# Patient Record
Sex: FEMALE | Race: WHITE | Employment: OTHER | URBAN - METROPOLITAN AREA
[De-identification: names, ages, dates, MRNs, and addresses within clinical notes are randomized per-mention and may not be internally consistent; named-entity substitution may affect disease eponyms.]

---

## 2021-05-03 ENCOUNTER — HOSPITAL ENCOUNTER (EMERGENCY)
Facility: HOSPITAL | Age: 86
Discharge: HOME | End: 2021-05-04
Attending: EMERGENCY MEDICINE
Payer: MEDICARE

## 2021-05-03 DIAGNOSIS — S09.90XA INJURY OF HEAD, INITIAL ENCOUNTER: ICD-10-CM
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
## 2021-05-03 DIAGNOSIS — N30.00 ACUTE CYSTITIS WITHOUT HEMATURIA: ICD-10-CM

## 2021-05-03 PROCEDURE — 99284 EMERGENCY DEPT VISIT MOD MDM: CPT

## 2021-05-03 PROCEDURE — 36415 COLL VENOUS BLD VENIPUNCTURE: CPT

## 2021-05-03 PROCEDURE — 93010 ELECTROCARDIOGRAM REPORT: CPT

## 2021-05-04 ENCOUNTER — APPOINTMENT (OUTPATIENT)
Dept: CT IMAGING | Facility: HOSPITAL | Age: 86
End: 2021-05-04
Attending: EMERGENCY MEDICINE
Payer: MEDICARE

## 2021-05-04 VITALS
SYSTOLIC BLOOD PRESSURE: 151 MMHG | OXYGEN SATURATION: 100 % | WEIGHT: 142 LBS | TEMPERATURE: 98 F | HEART RATE: 88 BPM | BODY MASS INDEX: 21.03 KG/M2 | RESPIRATION RATE: 20 BRPM | DIASTOLIC BLOOD PRESSURE: 85 MMHG | HEIGHT: 69 IN

## 2021-05-04 PROCEDURE — 87077 CULTURE AEROBIC IDENTIFY: CPT | Performed by: EMERGENCY MEDICINE

## 2021-05-04 PROCEDURE — 80053 COMPREHEN METABOLIC PANEL: CPT | Performed by: EMERGENCY MEDICINE

## 2021-05-04 PROCEDURE — 87086 URINE CULTURE/COLONY COUNT: CPT | Performed by: EMERGENCY MEDICINE

## 2021-05-04 PROCEDURE — 70450 CT HEAD/BRAIN W/O DYE: CPT | Performed by: EMERGENCY MEDICINE

## 2021-05-04 PROCEDURE — 81001 URINALYSIS AUTO W/SCOPE: CPT | Performed by: EMERGENCY MEDICINE

## 2021-05-04 PROCEDURE — 85610 PROTHROMBIN TIME: CPT | Performed by: EMERGENCY MEDICINE

## 2021-05-04 PROCEDURE — 87186 SC STD MICRODIL/AGAR DIL: CPT | Performed by: EMERGENCY MEDICINE

## 2021-05-04 PROCEDURE — 93005 ELECTROCARDIOGRAM TRACING: CPT

## 2021-05-04 PROCEDURE — 85025 COMPLETE CBC W/AUTO DIFF WBC: CPT | Performed by: EMERGENCY MEDICINE

## 2021-05-04 PROCEDURE — 72125 CT NECK SPINE W/O DYE: CPT | Performed by: EMERGENCY MEDICINE

## 2021-05-04 RX ORDER — PHENAZOPYRIDINE HYDROCHLORIDE 200 MG/1
200 TABLET, FILM COATED ORAL ONCE
Status: COMPLETED | OUTPATIENT
Start: 2021-05-04 | End: 2021-05-04

## 2021-05-04 RX ORDER — CEPHALEXIN 500 MG/1
500 CAPSULE ORAL ONCE
Status: COMPLETED | OUTPATIENT
Start: 2021-05-04 | End: 2021-05-04

## 2021-05-04 RX ORDER — METHIMAZOLE 5 MG/1
5 TABLET ORAL DAILY
COMMUNITY

## 2021-05-04 RX ORDER — WARFARIN SODIUM 2 MG/1
2 TABLET ORAL NIGHTLY
Status: ON HOLD | COMMUNITY
End: 2021-07-03

## 2021-05-04 RX ORDER — ATENOLOL 50 MG/1
50 TABLET ORAL 2 TIMES DAILY
COMMUNITY

## 2021-05-04 RX ORDER — CEPHALEXIN 500 MG/1
500 CAPSULE ORAL 4 TIMES DAILY
Qty: 28 CAPSULE | Refills: 0 | Status: SHIPPED | OUTPATIENT
Start: 2021-05-04 | End: 2021-05-14

## 2021-05-04 RX ORDER — MULTIVIT-MIN/IRON FUM/FOLIC AC 7.5 MG-4
1 TABLET ORAL DAILY
COMMUNITY

## 2021-05-04 RX ORDER — PROPAFENONE HYDROCHLORIDE 150 MG/1
150 TABLET, FILM COATED ORAL 3 TIMES DAILY
COMMUNITY

## 2021-05-04 RX ORDER — PHENAZOPYRIDINE HYDROCHLORIDE 100 MG/1
100 TABLET, FILM COATED ORAL 3 TIMES DAILY PRN
Qty: 6 TABLET | Refills: 0 | Status: SHIPPED | OUTPATIENT
Start: 2021-05-04 | End: 2021-05-07

## 2021-05-04 RX ORDER — LOSARTAN POTASSIUM 100 MG/1
100 TABLET ORAL 2 TIMES DAILY
Status: ON HOLD | COMMUNITY
End: 2021-12-04

## 2021-05-04 NOTE — ED QUICK NOTES
Pt going back to [facility], report called to Rosemary at [facility]

## 2021-05-04 NOTE — ED PROVIDER NOTES
Patient Seen in: [facility] Emergency Department      History   Patient presents with:  Fall    Stated Complaint: from [facility], arrived via EMS post fall.  Pt on Coumadin    HPI/Subjective:   HPI    Patient is an 56-year-old female prese intact, sclerae white, conjunctiva is pink.   Oropharynx is unremarkable, no exudate, dentition intact, no facial bone tenderness or septal hematomas, TMs clear bilaterally  NECK: Supple, trachea midline, no lymphadenopathy, full ROM cervical spine without components:    .0 (*)     RDW-SD 47.5 (*)     Neutrophil Absolute Prelim 7.72 (*)     Neutrophil Absolute 7.72 (*)     Monocyte Absolute 1.06 (*)     All other components within normal limits   CBC WITH DIFFERENTIAL WITH PLATELET    Narrative:     T clinical confidence and prior to discharge, that an emergency medical condition was not or was no longer present. There was no indication for further evaluation, treatment or admission on an emergency basis.   Comprehensive verbal and written discharge and

## 2021-05-04 NOTE — ED INITIAL ASSESSMENT (HPI)
Arrived via EMS from [facility] for fall eval. Pt states she was walking to the bathroom when she lost balance and fell. Pt arrived with C collar in place. Hematoma noted to the L forehead, unknown LOC. Pt denies neck and back pain.  Pt on Coumadi

## 2021-07-02 ENCOUNTER — HOSPITAL ENCOUNTER (OUTPATIENT)
Facility: HOSPITAL | Age: 86
Setting detail: OBSERVATION
Discharge: HOME | End: 2021-07-03
Attending: STUDENT IN AN ORGANIZED HEALTH CARE EDUCATION/TRAINING PROGRAM | Admitting: HOSPITALIST
Payer: MEDICARE

## 2021-07-02 DIAGNOSIS — Z79.01 CHRONIC ANTICOAGULATION: ICD-10-CM

## 2021-07-02 DIAGNOSIS — R79.1 SUPRATHERAPEUTIC INR: ICD-10-CM

## 2021-07-02 DIAGNOSIS — I10 HYPERTENSION, UNSPECIFIED TYPE: ICD-10-CM

## 2021-07-02 DIAGNOSIS — R04.0 EPISTAXIS: Primary | ICD-10-CM

## 2021-07-02 PROBLEM — D69.6 THROMBOCYTOPENIA: Status: ACTIVE | Noted: 2021-07-02

## 2021-07-02 PROBLEM — D69.6 THROMBOCYTOPENIA (HCC): Status: ACTIVE | Noted: 2021-07-02

## 2021-07-02 LAB
APTT PPP: 53 SECONDS (ref 25.4–36.1)
BASOPHILS # BLD AUTO: 0.04 X10(3) UL (ref 0–0.2)
BASOPHILS NFR BLD AUTO: 0.5 %
DEPRECATED RDW RBC AUTO: 48.2 FL (ref 35.1–46.3)
EOSINOPHIL # BLD AUTO: 0.13 X10(3) UL (ref 0–0.7)
EOSINOPHIL NFR BLD AUTO: 1.7 %
ERYTHROCYTE [DISTWIDTH] IN BLOOD BY AUTOMATED COUNT: 13.5 % (ref 11–15)
EST. AVERAGE GLUCOSE BLD GHB EST-MCNC: 163 MG/DL (ref 68–126)
GLUCOSE BLD-MCNC: 136 MG/DL (ref 70–99)
GLUCOSE BLD-MCNC: 142 MG/DL (ref 70–99)
HBA1C MFR BLD HPLC: 7.3 % (ref ?–5.7)
HCT VFR BLD AUTO: 40.7 %
HGB BLD-MCNC: 13 G/DL
IMM GRANULOCYTES # BLD AUTO: 0.02 X10(3) UL (ref 0–1)
IMM GRANULOCYTES NFR BLD: 0.3 %
INR BLD: 5.88 (ref 0.89–1.11)
LYMPHOCYTES # BLD AUTO: 1.56 X10(3) UL (ref 1–4)
LYMPHOCYTES NFR BLD AUTO: 20.4 %
MCH RBC QN AUTO: 30.5 PG (ref 26–34)
MCHC RBC AUTO-ENTMCNC: 31.9 G/DL (ref 31–37)
MCV RBC AUTO: 95.5 FL
MONOCYTES # BLD AUTO: 0.72 X10(3) UL (ref 0.1–1)
MONOCYTES NFR BLD AUTO: 9.4 %
NEUTROPHILS # BLD AUTO: 5.18 X10 (3) UL (ref 1.5–7.7)
NEUTROPHILS # BLD AUTO: 5.18 X10(3) UL (ref 1.5–7.7)
NEUTROPHILS NFR BLD AUTO: 67.7 %
PLATELET # BLD AUTO: 120 10(3)UL (ref 150–450)
PSA SERPL DL<=0.01 NG/ML-MCNC: 53.9 SECONDS (ref 12.4–14.6)
RBC # BLD AUTO: 4.26 X10(6)UL
WBC # BLD AUTO: 7.7 X10(3) UL (ref 4–11)

## 2021-07-02 PROCEDURE — 99219 INITIAL OBSERVATION CARE,LEVL II: CPT | Performed by: HOSPITALIST

## 2021-07-02 PROCEDURE — 09JY8ZZ INSPECTION OF SINUS, VIA NATURAL OR ARTIFICIAL OPENING ENDOSCOPIC: ICD-10-PCS | Performed by: OTOLARYNGOLOGY

## 2021-07-02 RX ORDER — LOSARTAN POTASSIUM 50 MG/1
50 TABLET ORAL DAILY
Status: DISCONTINUED | OUTPATIENT
Start: 2021-07-02 | End: 2021-07-02

## 2021-07-02 RX ORDER — DEXTROSE MONOHYDRATE 25 G/50ML
50 INJECTION, SOLUTION INTRAVENOUS
Status: DISCONTINUED | OUTPATIENT
Start: 2021-07-02 | End: 2021-07-03

## 2021-07-02 RX ORDER — MULTIPLE VITAMINS W/ MINERALS TAB 9MG-400MCG
1 TAB ORAL DAILY
Status: DISCONTINUED | OUTPATIENT
Start: 2021-07-02 | End: 2021-07-03

## 2021-07-02 RX ORDER — ATENOLOL 50 MG/1
50 TABLET ORAL 2 TIMES DAILY
Status: DISCONTINUED | OUTPATIENT
Start: 2021-07-02 | End: 2021-07-03

## 2021-07-02 RX ORDER — ATENOLOL 50 MG/1
50 TABLET ORAL
Status: DISCONTINUED | OUTPATIENT
Start: 2021-07-02 | End: 2021-07-02

## 2021-07-02 RX ORDER — TRANEXAMIC ACID 100 MG/ML
5 INJECTION, SOLUTION INTRAVENOUS ONCE
Status: COMPLETED | OUTPATIENT
Start: 2021-07-02 | End: 2021-07-02

## 2021-07-02 RX ORDER — LORAZEPAM 2 MG/ML
0.5 INJECTION INTRAMUSCULAR ONCE
Status: COMPLETED | OUTPATIENT
Start: 2021-07-02 | End: 2021-07-02

## 2021-07-02 RX ORDER — AMOXICILLIN AND CLAVULANATE POTASSIUM 875; 125 MG/1; MG/1
1 TABLET, FILM COATED ORAL 2 TIMES DAILY
Qty: 10 TABLET | Refills: 0 | Status: SHIPPED | OUTPATIENT
Start: 2021-07-02 | End: 2021-07-07

## 2021-07-02 RX ORDER — PROPAFENONE HYDROCHLORIDE 150 MG/1
150 TABLET, FILM COATED ORAL EVERY 8 HOURS
Status: DISCONTINUED | OUTPATIENT
Start: 2021-07-02 | End: 2021-07-03

## 2021-07-02 RX ORDER — LOSARTAN POTASSIUM 50 MG/1
50 TABLET ORAL DAILY
Status: DISCONTINUED | OUTPATIENT
Start: 2021-07-03 | End: 2021-07-03

## 2021-07-02 RX ORDER — ACETAMINOPHEN 500 MG
1000 TABLET ORAL ONCE
Status: COMPLETED | OUTPATIENT
Start: 2021-07-02 | End: 2021-07-02

## 2021-07-02 RX ORDER — AMOXICILLIN AND CLAVULANATE POTASSIUM 875; 125 MG/1; MG/1
875 TABLET, FILM COATED ORAL ONCE
Status: COMPLETED | OUTPATIENT
Start: 2021-07-02 | End: 2021-07-02

## 2021-07-02 RX ORDER — METHIMAZOLE 5 MG/1
5 TABLET ORAL DAILY
Status: DISCONTINUED | OUTPATIENT
Start: 2021-07-02 | End: 2021-07-03

## 2021-07-02 RX ORDER — AMOXICILLIN AND CLAVULANATE POTASSIUM 875; 125 MG/1; MG/1
875 TABLET, FILM COATED ORAL EVERY 12 HOURS SCHEDULED
Status: DISCONTINUED | OUTPATIENT
Start: 2021-07-02 | End: 2021-07-03

## 2021-07-02 NOTE — CONSULTS
[facility]  Report of Consultation    Marty Forbes Patient Status:  Observation    1935 MRN [id number]   [facility] 3NE-A Attending Roberto Carlos Davis MD   Hosp Day # 0 PCP Michael Perera DO     Reason for Consultation:  Epistaxis mg, 50 mg, Oral, Daily  •  methimazole (TAPAZOLE) tab 5 mg, 5 mg, Oral, Daily  •  multivitamin with minerals (ADULT) tab 1 tablet, 1 tablet, Oral, Daily  •  Propafenone HCl (RHTHYMOL) tab 150 mg, 150 mg, Oral, Q8H  •  glucose (DEX4) oral liquid 15 g, 15 g, 07/02/2021    PTP 53.9 07/02/2021    PGLU 136 07/02/2021     Component   Ref Range & Units 7/2/21  9:45 AM   PTT   25.4 - 36.1 seconds         Component   Ref Range & Units 7/2/21  9:45 AM   PT   12.4 - 14.6 seconds 53. 9High     INR   0.89 - 1.11 5. 88High

## 2021-07-02 NOTE — ED QUICK NOTES
<=\"\">  Immunizations                Covid-19 Vaccine Pfizer 30 mcg/0.3 ml 3/1/2021, 2/8/2021         <=\"\">  Immunizations from Immunization Registries      <=\"\">  [place] Dept of [facility]     <=\"\">  Immunizations as of today at 9:17 AM     Administ

## 2021-07-02 NOTE — ED QUICK NOTES
Pt ambulated to the bathroom with assistance by her daughter. Pt has steady gait. Upon returning to the room patients nose began to bleed again, MD aware.

## 2021-07-02 NOTE — PROCEDURES
Nasal Endoscopy Procedure Note (12222)     Due to inability for adequate examination of the middle meatus and need for magnification to perform the examination, endoscopy was performed.  Risks and benefits were discussed with patient/family and they have g

## 2021-07-02 NOTE — ED PROVIDER NOTES
Patient Seen in: [facility] Emergency Department      History   Patient presents with:  Nose Bleed    Stated Complaint: NOSE BLEED    HPI/Subjective:   HPI    Patient is an 81-year-old female with hypertension and previous history of epistaxis who i 0919 None (Room air)       Current:BP (!) 154/95   Pulse 88   Temp 98 °F (36.7 °C)   Resp 18   Ht 175.3 cm (5' 9\")   Wt 65.8 kg   SpO2 95%   BMI 21.41 kg/m²         Physical Exam    Constitutional: oriented to person, place, and time, appears well-develop CBC W/ DIFFERENTIAL[[id number]]          Abnormal            Final result                 Please view results for these tests on the individual orders. MDM      Patient evaluated thoroughly.   I suspect epistaxis is related to the right side diagnosis)  Hypertension, unspecified type  Chronic anticoagulation  Supratherapeutic INR     Disposition:  Discharge  7/2/2021 10:47 am    Follow-up:  Cristina Gonzalez MD  [address] [place], [place] [address] [address]  [phone]    Schedule an popeye

## 2021-07-02 NOTE — H&P
[facility]  History and Physical     Mathew Rachele Patient Status:  Observation    1935 MRN [id number]   [facility] 3NE-A Attending Jessica Bravo MD   Hosp Day # 0 PCP Reyna Arguello DO     Chief Complaint: nosebleed    His mouth 2 (two) times daily with meals. , Disp: , Rfl:   Propafenone HCl 150 MG Oral Tab, Take 150 mg by mouth every 8 (eight) hours. , Disp: , Rfl:   SITagliptin Phosphate 100 MG Oral Tab, Take 100 mg by mouth daily. , Disp: , Rfl:   methimazole 5 MG Oral Tab, COVID19    Pro-Calcitonin  No results for input(s): PCT in the last 168 hours. Cardiac  No results for input(s): TROP, PBNP in the last 168 hours. Creatinine Kinase  No results for input(s): CK in the last 168 hours.     Inflammatory Markers  No resul

## 2021-07-02 NOTE — ED QUICK NOTES
Pt daughter to nurses station stating that the pressure from the packing in the patients nose is too much for her. MD aware.

## 2021-07-03 VITALS
SYSTOLIC BLOOD PRESSURE: 133 MMHG | HEART RATE: 83 BPM | BODY MASS INDEX: 21.48 KG/M2 | HEIGHT: 69 IN | WEIGHT: 145 LBS | DIASTOLIC BLOOD PRESSURE: 81 MMHG | TEMPERATURE: 99 F | RESPIRATION RATE: 22 BRPM | OXYGEN SATURATION: 97 %

## 2021-07-03 LAB
BASOPHILS # BLD AUTO: 0.03 X10(3) UL (ref 0–0.2)
BASOPHILS NFR BLD AUTO: 0.4 %
CREAT BLD-MCNC: 0.84 MG/DL
DEPRECATED RDW RBC AUTO: 45.3 FL (ref 35.1–46.3)
EOSINOPHIL # BLD AUTO: 0.03 X10(3) UL (ref 0–0.7)
EOSINOPHIL NFR BLD AUTO: 0.4 %
ERYTHROCYTE [DISTWIDTH] IN BLOOD BY AUTOMATED COUNT: 13.3 % (ref 11–15)
GLUCOSE BLD-MCNC: 157 MG/DL (ref 70–99)
GLUCOSE BLD-MCNC: 187 MG/DL (ref 70–99)
HCT VFR BLD AUTO: 36.8 %
HCT VFR BLD AUTO: 39.3 %
HGB BLD-MCNC: 12.2 G/DL
HGB BLD-MCNC: 12.7 G/DL
IMM GRANULOCYTES # BLD AUTO: 0.03 X10(3) UL (ref 0–1)
IMM GRANULOCYTES NFR BLD: 0.4 %
INR BLD: 5.49 (ref 0.89–1.11)
LYMPHOCYTES # BLD AUTO: 1.58 X10(3) UL (ref 1–4)
LYMPHOCYTES NFR BLD AUTO: 19.2 %
MCH RBC QN AUTO: 30.4 PG (ref 26–34)
MCHC RBC AUTO-ENTMCNC: 33.2 G/DL (ref 31–37)
MCV RBC AUTO: 91.8 FL
MONOCYTES # BLD AUTO: 0.68 X10(3) UL (ref 0.1–1)
MONOCYTES NFR BLD AUTO: 8.3 %
NEUTROPHILS # BLD AUTO: 5.88 X10 (3) UL (ref 1.5–7.7)
NEUTROPHILS # BLD AUTO: 5.88 X10(3) UL (ref 1.5–7.7)
NEUTROPHILS NFR BLD AUTO: 71.3 %
PLATELET # BLD AUTO: 115 10(3)UL (ref 150–450)
PSA SERPL DL<=0.01 NG/ML-MCNC: 51.1 SECONDS (ref 12.4–14.6)
RBC # BLD AUTO: 4.01 X10(6)UL
WBC # BLD AUTO: 8.2 X10(3) UL (ref 4–11)

## 2021-07-03 PROCEDURE — 99217 OBSERVATION CARE DISCHARGE: CPT | Performed by: HOSPITALIST

## 2021-07-03 NOTE — PROGRESS NOTES
[facility]  Progress Note     Cally Redding Patient Status:  Observation    1935 MRN [id number]   [facility] 3NE-A Attending Aldair Schulz MD   Hosp Day # 0 PCP Edilberto Heaton DO     Chief Complaint: nosebleed    S: Patient h LAURA, LDH, DDIMER in the last 168 hours. Imaging: Imaging data reviewed in Epic.     Medications:   • atenolol  50 mg Oral BID   • losartan Potassium  50 mg Oral Daily   • methimazole  5 mg Oral Daily   • multivitamin with minerals  1 tablet Oral Daily

## 2021-07-03 NOTE — PROGRESS NOTES
Report given to [facility] at [phone] at [facility]

## 2021-07-03 NOTE — CM/SW NOTE
Received call from RN, patient will likely dc today- dependent on her lab results at 4 pm.   Spoke to daughter to inform of above. She will drive her mother to [facility].  Daughter Kim Kelly asking if [facility] is able to provide transport to patients next MD appoin

## 2021-07-03 NOTE — PROGRESS NOTES
NURSING DISCHARGE NOTE    Discharged Nursing home via Wheelchair. Accompanied by Family member  Belongings Taken by patient/family. IV and Tele removed. Discharge paperwork given to daughter and the patient.     Report given to [address] at [facility]

## 2021-07-03 NOTE — PLAN OF CARE
A/O x 4  Vitals stable, on room air, denies SOB  QID accuchecks  Up with stanby assistance and a cane  Denies having pain   No bleeding observed  Hg is stable   R anabella hooks in place c/d/i  Family is at the bedside  Discharge orders received   Discharge p as appropriate  Outcome: Progressing     Problem: RISK FOR INFECTION - ADULT  Goal: Absence of fever/infection during anticipated neutropenic period  Description: INTERVENTIONS  - Monitor WBC  - Administer growth factors as ordered  - Implement neutropenic

## 2021-07-03 NOTE — PROGRESS NOTES
Pt A&Ox4 Room Air  Resting in bed  Meds given per Mar  Denies pain at this time  Right Nare Rhino Rocket in place, per ENT should remind in place till Wednesday.   PO Abx  Coumadin on Hold  Accucheck QID, nO Coverage this shift  Up with steady gait, persona

## 2021-07-20 NOTE — DISCHARGE SUMMARY
[facility]  DISCHARGE SUMMARY     Yarely Nassar Patient Status:  Observation    1935 MRN [id number]   [facility] 3NE-A Attending No att. providers found   Hosp Day # 0 PCP Camilla Acevedo DO     Date of Admission: 2021  Da medications      Instructions Prescription details   atenolol 50 MG Tabs  Commonly known as: TENORMIN      Take 50 mg by mouth 2 (two) times a day. Refills: 0     losartan Potassium 50 MG Tabs  Commonly known as: COZAAR      Take by mouth daily.    Refill No acute distress. Respiratory: Clear to auscultation bilaterally. No wheezes. No rhonchi. Cardiovascular: S1, S2. Regular rate and rhythm. No murmurs, rubs or gallops. Abdomen: Soft, nontender, nondistended. Positive bowel sounds.  No rebound or guar

## 2021-12-02 ENCOUNTER — APPOINTMENT (OUTPATIENT)
Dept: CV DIAGNOSTICS | Facility: HOSPITAL | Age: 86
DRG: 194 | End: 2021-12-02
Attending: HOSPITALIST
Payer: MEDICARE

## 2021-12-02 ENCOUNTER — APPOINTMENT (OUTPATIENT)
Dept: GENERAL RADIOLOGY | Facility: HOSPITAL | Age: 86
DRG: 194 | End: 2021-12-02
Attending: EMERGENCY MEDICINE
Payer: MEDICARE

## 2021-12-02 ENCOUNTER — HOSPITAL ENCOUNTER (INPATIENT)
Facility: HOSPITAL | Age: 86
LOS: 2 days | Discharge: HOME HEALTH | DRG: 194 | End: 2021-12-04
Attending: EMERGENCY MEDICINE | Admitting: HOSPITALIST
Payer: MEDICARE

## 2021-12-02 DIAGNOSIS — R50.9 ACUTE FEBRILE ILLNESS: ICD-10-CM

## 2021-12-02 DIAGNOSIS — J18.9 PNEUMONIA DUE TO INFECTIOUS ORGANISM, UNSPECIFIED LATERALITY, UNSPECIFIED PART OF LUNG: Primary | ICD-10-CM

## 2021-12-02 DIAGNOSIS — I48.20 ATRIAL FIBRILLATION, CHRONIC (HCC): ICD-10-CM

## 2021-12-02 DIAGNOSIS — R09.02 HYPOXIA: ICD-10-CM

## 2021-12-02 PROCEDURE — 71045 X-RAY EXAM CHEST 1 VIEW: CPT | Performed by: EMERGENCY MEDICINE

## 2021-12-02 PROCEDURE — 99223 1ST HOSP IP/OBS HIGH 75: CPT | Performed by: HOSPITALIST

## 2021-12-02 PROCEDURE — 93306 TTE W/DOPPLER COMPLETE: CPT | Performed by: HOSPITALIST

## 2021-12-02 RX ORDER — ACETAMINOPHEN 500 MG
1000 TABLET ORAL ONCE
Status: COMPLETED | OUTPATIENT
Start: 2021-12-02 | End: 2021-12-02

## 2021-12-02 RX ORDER — METHIMAZOLE 5 MG/1
5 TABLET ORAL DAILY
Status: DISCONTINUED | OUTPATIENT
Start: 2021-12-02 | End: 2021-12-04

## 2021-12-02 RX ORDER — POLYETHYLENE GLYCOL 3350 17 G/17G
17 POWDER, FOR SOLUTION ORAL DAILY PRN
Status: DISCONTINUED | OUTPATIENT
Start: 2021-12-02 | End: 2021-12-04

## 2021-12-02 RX ORDER — WARFARIN SODIUM 2 MG/1
2 TABLET ORAL
COMMUNITY

## 2021-12-02 RX ORDER — FUROSEMIDE 10 MG/ML
20 INJECTION INTRAMUSCULAR; INTRAVENOUS ONCE
Status: DISCONTINUED | OUTPATIENT
Start: 2021-12-02 | End: 2021-12-03

## 2021-12-02 RX ORDER — WARFARIN SODIUM 2 MG/1
4 TABLET ORAL
Status: DISCONTINUED | OUTPATIENT
Start: 2021-12-02 | End: 2021-12-04

## 2021-12-02 RX ORDER — LOSARTAN POTASSIUM 100 MG/1
100 TABLET ORAL 2 TIMES DAILY
Status: DISCONTINUED | OUTPATIENT
Start: 2021-12-02 | End: 2021-12-02

## 2021-12-02 RX ORDER — PROPAFENONE HYDROCHLORIDE 150 MG/1
150 TABLET, FILM COATED ORAL 3 TIMES DAILY
Status: DISCONTINUED | OUTPATIENT
Start: 2021-12-02 | End: 2021-12-04

## 2021-12-02 RX ORDER — POTASSIUM CHLORIDE 20 MEQ/1
40 TABLET, EXTENDED RELEASE ORAL DAILY
Status: COMPLETED | OUTPATIENT
Start: 2021-12-02 | End: 2021-12-02

## 2021-12-02 RX ORDER — SODIUM PHOSPHATE, DIBASIC AND SODIUM PHOSPHATE, MONOBASIC 7; 19 G/133ML; G/133ML
1 ENEMA RECTAL ONCE AS NEEDED
Status: DISCONTINUED | OUTPATIENT
Start: 2021-12-02 | End: 2021-12-04

## 2021-12-02 RX ORDER — BISACODYL 10 MG
10 SUPPOSITORY, RECTAL RECTAL
Status: DISCONTINUED | OUTPATIENT
Start: 2021-12-02 | End: 2021-12-04

## 2021-12-02 RX ORDER — WARFARIN SODIUM 1 MG/1
2 TABLET ORAL
Status: DISCONTINUED | OUTPATIENT
Start: 2021-12-03 | End: 2021-12-04

## 2021-12-02 RX ORDER — ACETAMINOPHEN 325 MG/1
650 TABLET ORAL EVERY 6 HOURS PRN
Status: DISCONTINUED | OUTPATIENT
Start: 2021-12-02 | End: 2021-12-04

## 2021-12-02 RX ORDER — DEXTROSE MONOHYDRATE 25 G/50ML
50 INJECTION, SOLUTION INTRAVENOUS
Status: DISCONTINUED | OUTPATIENT
Start: 2021-12-02 | End: 2021-12-04

## 2021-12-02 RX ORDER — FUROSEMIDE 10 MG/ML
20 INJECTION INTRAMUSCULAR; INTRAVENOUS ONCE
Status: DISCONTINUED | OUTPATIENT
Start: 2021-12-03 | End: 2021-12-02

## 2021-12-02 RX ORDER — METFORMIN HYDROCHLORIDE 500 MG/1
500 TABLET, EXTENDED RELEASE ORAL 2 TIMES DAILY
COMMUNITY
Start: 2021-11-21

## 2021-12-02 RX ORDER — ACETAMINOPHEN 325 MG/1
650 TABLET ORAL EVERY 6 HOURS PRN
COMMUNITY

## 2021-12-02 RX ORDER — METOCLOPRAMIDE HYDROCHLORIDE 5 MG/ML
5 INJECTION INTRAMUSCULAR; INTRAVENOUS EVERY 8 HOURS PRN
Status: DISCONTINUED | OUTPATIENT
Start: 2021-12-02 | End: 2021-12-04

## 2021-12-02 RX ORDER — ONDANSETRON 2 MG/ML
4 INJECTION INTRAMUSCULAR; INTRAVENOUS EVERY 6 HOURS PRN
Status: DISCONTINUED | OUTPATIENT
Start: 2021-12-02 | End: 2021-12-04

## 2021-12-02 RX ORDER — DOCUSATE SODIUM 100 MG/1
100 CAPSULE, LIQUID FILLED ORAL 2 TIMES DAILY
Status: DISCONTINUED | OUTPATIENT
Start: 2021-12-02 | End: 2021-12-04

## 2021-12-02 RX ORDER — FUROSEMIDE 10 MG/ML
20 INJECTION INTRAMUSCULAR; INTRAVENOUS ONCE
Status: COMPLETED | OUTPATIENT
Start: 2021-12-02 | End: 2021-12-02

## 2021-12-02 RX ORDER — ATENOLOL 50 MG/1
50 TABLET ORAL 2 TIMES DAILY
Status: DISCONTINUED | OUTPATIENT
Start: 2021-12-02 | End: 2021-12-04

## 2021-12-02 NOTE — PLAN OF CARE
Patient is alert and oriented x 4. On 3L per nasal canula. IVF infusing at 10 ml/hr. Due to void after straight catheterization in ER. Denies pain. SCDs on. IS and ankle pumps encouraged, call light within reach and encouraged to call for assistance.  All s

## 2021-12-02 NOTE — CONSULTS
[facility]  Cardiology Consultation    Nathanael Bence Patient Status:  Inpatient    1935 MRN [id number]   [facility] 3SW-A Attending Kristan Brock [facility] Day # 0 PCP Jana Singh DO     Reason for Consultation:  CHF glucose (DEX4) oral liquid 30 g, 30 g, Oral, Q15 Min PRN **OR** glucose-vitamin C (DEX-4) chewable tab 8 tablet, 8 tablet, Oral, Q15 Min PRN  •  acetaminophen (TYLENOL) tab 650 mg, 650 mg, Oral, Q6H PRN  •  ondansetron (ZOFRAN) injection 4 mg, 4 mg, [facility] Raul anteriorly  Abdomen: Soft, non-tender. Extremities: Without edema  Neurologic: Alert and oriented, normal affect. Skin: Warm and dry.      Laboratory Data:  Lab Results   Component Value Date    WBC 11.9 12/02/2021    HGB 12.9 12/02/2021    HCT 39.2 12/

## 2021-12-02 NOTE — H&P
[facility]  History and Physical     Eris Hernandez Patient Status:  Emergency    1935 MRN [id number]   Location [address] Attending Darius Roach, [address] [facility] Day # 0 PCP Woody Flowers DO     Chief Complaint: Anita Randolph mouth 2 (two) times daily. , Disp: , Rfl:   Propafenone HCl 150 MG Oral Tab, Take 150 mg by mouth in the morning, at noon, and at bedtime. , Disp: , Rfl:   SITagliptin Phosphate 100 MG Oral Tab, Take 100 mg by mouth daily. , Disp: , Rfl:   methimazole 5 MG Or COVID-19 Lab Results    COVID-19  Lab Results   Component Value Date    COVID19 Not Detected 12/02/2021       Pro-Calcitonin  No results for input(s): PCT in the last 168 hours.     Cardiac  Recent Labs   Lab 12/02/21  0852   TROP <0.045   PBNP 7,303*

## 2021-12-02 NOTE — ED PROVIDER NOTES
Patient Seen in: [facility] Emergency Department      History   Patient presents with:  Difficulty Breathing    Stated Complaint: SOB    Subjective:   HPI    81-year-old female with history of atrial fibrillation on Coumadin, diabetes, hypertension, there is no nuchal rigidity. HEART: Irregular irregular  LUNGS: Coarse breath sounds bilaterally with scattered rhonchi.   No wheezing   ABDOMEN: Soft, nondistended,non tender  EXTREMITIES: 1+ bilateral pretibial edema, no calf tenderness    ED Course Abnormal            Final result                 Please view results for these tests on the individual orders.    RAINBOW DRAW LAVENDER   RAINBOW DRAW LIGHT GREEN   RAINBOW DRAW BLUE   RAINBOW DRAW GOLD   BLOOD CULTURE   BLOOD CULTURE     EKG    Rate specified.         Medications Prescribed:  Current Discharge Medication List                          Hospital Problems             Present on Admission  Date Reviewed: 7/7/2021          ICD-10-CM Noted POA    * (Principal) Pneumonia due to infectious orga

## 2021-12-02 NOTE — ED QUICK NOTES
Orders for admission, patient is aware of plan and ready to go upstairs. Any questions, please call ED RN Yana Ann at extension [phone]. Vaccinated?  Yes   Type of COVID test sent: rapid  COVID Suspicion level: Low      Titratable drug(s) infusing: none  Ra

## 2021-12-02 NOTE — CM/SW NOTE
12/02/21 1500   CM/SW Referral Data   Referral Source Social Work (self-referral)   Reason for Referral Discharge planning;Protocol order set   Specify order set Pneumonia   Informant Patient;EMR   Patient Info   Patient's Current Mental Status at Time

## 2021-12-02 NOTE — ED INITIAL ASSESSMENT (HPI)
Pt arrives via BLS for \"fatigue\". Pt arrives with bilateral audible crackles and difficulty breathing.

## 2021-12-03 PROCEDURE — 99232 SBSQ HOSP IP/OBS MODERATE 35: CPT | Performed by: HOSPITALIST

## 2021-12-03 RX ORDER — POTASSIUM CHLORIDE 20 MEQ/1
20 TABLET, EXTENDED RELEASE ORAL DAILY
Status: COMPLETED | OUTPATIENT
Start: 2021-12-03 | End: 2021-12-04

## 2021-12-03 RX ORDER — FUROSEMIDE 20 MG/1
20 TABLET ORAL DAILY
Status: COMPLETED | OUTPATIENT
Start: 2021-12-03 | End: 2021-12-04

## 2021-12-03 NOTE — OCCUPATIONAL THERAPY NOTE
OCCUPATIONAL THERAPY EVALUATION - INPATIENT    Room Number: 376/376-A  Evaluation Date: 12/3/2021     Type of Evaluation: Initial  Presenting Problem: Pneumonia    Physician Order: IP Consult to Occupational Therapy  Reason for Therapy:  ADL/IADL Dysfuncti Verbalized understanding    Equipment used: RW  Demonstrates functional use; encouraged pt to utilize for energy conservation    Therapist comments: Pt pleasant and agreeable to session, dtr included in education.  Pt demo's ability to complete all ADLs w None  -   Eating meals?: None    AM-PAC Score:  Score: 24  Approx Degree of Impairment: 0%  Standardized Score (AM-PAC Scale): 57.54    PLAN   Patient has been evaluated and presents with no skilled Occupational Therapy needs at this time.   Patient dischar

## 2021-12-03 NOTE — PAYOR COMM NOTE
--------------  ADMISSION REVIEW     Payor: [employer] MEDICARE ADV PPO  Subscriber #:  [id number]  Authorization Number: [id number]    Admit date: 12/2/21  Admit time: 10:50 AM       REVIEW DOCUMENTATION:     ED Provider Notes      ED Provider Notes signed by src 12/02/21 0908 Tympanic   SpO2 12/02/21 0908 99 %   O2 Device 12/02/21 0906 Nasal cannula       Current:BP (!) 130/98   Pulse 104   Temp (!) 100.8 °F (38.2 °C) (Tympanic)   Resp 22   Ht 172.7 cm (5' 8\")   Wt 63.5 kg   SpO2 99%   BMI 21.29 kg/m² within normal limits   LACTIC ACID, PLASMA - Normal   TROPONIN I - Normal   RAPID SARS-COV-2 BY PCR - Normal   CBC WITH DIFFERENTIAL WITH PLATELET    Narrative: The following orders were created for panel order CBC With Differential With Platelet.   Pro for fluid overload. Patient be admitted for further evaluation and treatment.                            Disposition and Plan     Clinical Impression:  Pneumonia due to infectious organism, unspecified laterality, unspecified part of lung  (primary encount Kidney stone    • Osteoarthritis    • Thyrotoxicosis         Past Surgical History:   Past Surgical History:   Procedure Laterality Date   • APPENDECTOMY     • REMOVAL GALLBLADDER         Social History:  She has never used smokeless tobacco. She reports t lymphadenopathy. No JVD. No carotid bruits. Respiratory: Clear to auscultation bilaterally. No wheezes. No rhonchi. Cardiovascular: S1, S2. Regular rate and rhythm. No murmurs, rubs or gallops. Equal pulses. Chest and Back: No tenderness or deformity. hypoglycemia protocol with correction factor insulin. 4. Atrial fibrillation-we will continue atenolol and Rythmol  for rate control and will continue Coumadin for anticoagulation. 5. Hypertension-we will continue on losartan and atenolol.   6. Hyperthyro tab 150 mg     Date Action Dose Route User    12/3/2021 0856 Given 150 mg Oral Jorge Torres RN    12/2/2021 2247 Given 150 mg Oral Fadumo Green RN    12/2/2021 1515 Given 150 mg Oral Liliana Goldstein RN      warfarin (COUMADIN) tab 4 mg 2-3  - rate control strategy for afib, will ask EP service to comment on need for propafenone  - monitor kidney function and replete lytes per protocol  - no need for stress test at present     Thank you for allowing me to participate in the care of your p reasonably be expected to span two midnight's based on the clinical documentation in H+P. Based on patients current state of illness, I anticipate that, after discharge, patient will require TBD.                      Electronically signed by Humphrey Roman

## 2021-12-03 NOTE — PROGRESS NOTES
[facility]     Hospitalist Progress Note     Iam Colette Patient Status:  Inpatient    1935 MRN [id number]   [facility] 3SW-A Attending Gurmeet [place]  [place] Day # 1 PCP Edilberto Heaton DO     Chief Complaint: pneumo hours.    Inflammatory Markers  No results for input(s): CRP, LAURA, LDH, DDIMER in the last 168 hours. Imaging: Imaging data reviewed in Epic.     Medications:   • atenolol  50 mg Oral BID   • methimazole  5 mg Oral Daily   • propafenone  150 mg Oral TID Home            **Certification      PHYSICIAN Certification of Need for Inpatient Hospitalization - Initial Certification    Patient will require inpatient services that will reasonably be expected to span two midnight's based on the clinical documentatio

## 2021-12-03 NOTE — PROGRESS NOTES
12/03/21 0986   Clinical Encounter Type   Visited With Patient; Health care provider   Routine Visit   (Responded to the request received)   Continue Visiting   (Empowered Arsh Nguyen to call  anytime for further care through her nurse)   Patient's Supp

## 2021-12-03 NOTE — PLAN OF CARE
Patient is alert and oriented x4, hard of hearing, no hearing aids. Denies any pain at the moment. Remains on IV antibiotics. The supplemental O2 has been titrated down. Now remains on 1L . Frequent IS has been done. O2 walk has been done. See the note.

## 2021-12-03 NOTE — PROGRESS NOTES
[facility]     Cardiology Progress Note    Jemima Fowler Patient Status:  Inpatient    1935 MRN [id number]   [facility]SW-A [facility] Day [facility] 1 PCP Maia Pelletier DO       SUBJECTIVE:  No cardiac Labs:     Recent Labs   Lab 12/02/21  0852 12/03/21  0600   WBC 11.9* 8.8   HGB 12.9 10.5*   MCV 89.7 92.7   .0* 100.0*   INR 2.06*  --        Recent Labs   Lab 12/02/21  0852 12/03/21  0600   * 141   K 3.9 3.9    110   CO2 23.0 24.0 oral liquid 15 g, 15 g, Oral, Q15 Min PRN   Or  glucose-vitamin C (DEX-4) chewable tab 4 tablet, 4 tablet, Oral, Q15 Min PRN   Or  dextrose 50 % injection 50 mL, 50 mL, Intravenous, Q15 Min PRN   Or  glucose (DEX4) oral liquid 30 g, 30 g, Oral, Q15 Min PRN Afebrile. Using 02 NC. Rapid COVID negative. · Anemia: Hgb: 10.5. · Hyperthyroidism: ? On Tapazole. Check TSH. Summary:   Remains AF with moderate improved rate control on BB and Rythmol. Noted increased QRS with new LBBB since 5/4/21.  Repeat EKG:

## 2021-12-03 NOTE — PLAN OF CARE
pt resting in bed comfortably at this time. Vss at this time. Pt on 2L via NC satting at 93% while asleep. Tolerating diet.  Voiding in  Commode through the night with no urine output freely, pt bladder scanned frequently, at 0530 bladder scan showing >400m

## 2021-12-03 NOTE — PHYSICAL THERAPY NOTE
PHYSICAL THERAPY QUICK EVALUATION - INPATIENT    Room Number: 376/376-A  Evaluation Date: 12/3/2021  Presenting Problem: pneumonia     Physician Order: PT Eval and Treat    Problem List  Principal Problem:    Pneumonia due to infectious organism, unspeci Patient Difficulty: Sitting down on and standing up from a chair with arms (e.g., wheelchair, bedside commode, etc.): None   Patient Difficulty: Moving from lying on back to sitting on the side of the bed?: None   How much help from another person does t include [facility]. Based on this evaluation, patient's clinical presentation is stable and overall evaluation complexity is considered low. Pt is at MOD I/supervision level with functional mobility. No further skilled IP PT warranted.  Recommend Pt discharge ho

## 2021-12-03 NOTE — PROGRESS NOTES
12/03/21 1134   Mobility   O2 walk?  Yes   SPO2% on Oxygen at Rest 94   At rest oxygen flow (liters per minute) 1   SPO2% Ambulation on Room Air 85   SPO2% Ambulation on Oxygen 98   Ambulation oxygen flow (liters per minute) 2

## 2021-12-04 VITALS
BODY MASS INDEX: 23.42 KG/M2 | WEIGHT: 154.5 LBS | TEMPERATURE: 98 F | OXYGEN SATURATION: 93 % | RESPIRATION RATE: 18 BRPM | SYSTOLIC BLOOD PRESSURE: 131 MMHG | HEIGHT: 67.99 IN | HEART RATE: 85 BPM | DIASTOLIC BLOOD PRESSURE: 79 MMHG

## 2021-12-04 PROCEDURE — 99239 HOSP IP/OBS DSCHRG MGMT >30: CPT | Performed by: HOSPITALIST

## 2021-12-04 RX ORDER — DOXYCYCLINE HYCLATE 100 MG
100 TABLET ORAL 2 TIMES DAILY
Qty: 16 TABLET | Refills: 0 | Status: SHIPPED | OUTPATIENT
Start: 2021-12-04 | End: 2021-12-12

## 2021-12-04 RX ORDER — AMOXICILLIN AND CLAVULANATE POTASSIUM 875; 125 MG/1; MG/1
1 TABLET, FILM COATED ORAL 2 TIMES DAILY
Qty: 16 TABLET | Refills: 0 | Status: SHIPPED | OUTPATIENT
Start: 2021-12-04 | End: 2021-12-12

## 2021-12-04 RX ORDER — LOSARTAN POTASSIUM 100 MG/1
50 TABLET ORAL 2 TIMES DAILY
Status: SHIPPED | COMMUNITY
Start: 2021-12-04

## 2021-12-04 NOTE — PROGRESS NOTES
Progress Note  Lyric Wong Patient Status:  Inpatient    1935 MRN [id number]   [facility]SW-A [facility] Day [facility] 2 PCP Jose Turner DO     Subjective:  No overnight events  Awake, alert, sitting Head: Normocephalic and atraumatic. Eyes: EOM are normal.   Neck: No JVD present. Cardiovascular: Normal rate. An irregularly irregular rhythm present. Exam reveals no gallop and no friction rub. No murmur heard.   Pulmonary/Chest: Effort normal.

## 2021-12-04 NOTE — PROGRESS NOTES
[facility]     Hospitalist Progress Note     Guy Barajas Patient Status:  Inpatient    1935 MRN [id number]   [facility] 3SW-A Attending Yeimi Pierce[address] Day # 2 PCP Halley Irvin DO     Chief Complaint: pneumo Component Value Date    COVID19 Not Detected 12/02/2021       Pro-Calcitonin  No results for input(s): PCT in the last 168 hours.     Cardiac  Recent Labs   Lab 12/02/21  0852   TROP <0.045   PBNP 7,303*       Creatinine Kinase  No results for input(s): C isolation: Yes    Will the patient be referred to [facility] on discharge?:  No  Estimated date of discharge: TBD  Discharge is dependent on: Clinical improvement  At this point Ms. Geovani Calderon is expected to be discharge to: Home

## 2021-12-04 NOTE — PLAN OF CARE
pt resting in bed comfortably at this time. Vss at this time. Pt on RA satting at 90-92% while asleep. Tolerating diet. Voiding in  commode, up with min ast walker & gait belt.  Bed alarm in place pt verbalized understanding of poc & call dont fall protocol

## 2021-12-04 NOTE — PLAN OF CARE
Patient alert and oriented x4. VSS, on RA when awake. 1L o2 occasionally needed when asleep. Dyspnea on exertion, 1L o2. Tele in place; a-fib. Denies pain. Ambulating to the bathroom x1 person min assist. Patient states she is feeling weak, appetite poor.

## 2021-12-07 NOTE — DISCHARGE SUMMARY
[facility]  DISCHARGE SUMMARY     Eris Hernandez Patient Status:  Inpatient    1935 MRN [id number]   [facility] 3SW-A Attending No att. providers found   Hosp Day # 2 PCP Woody Flowers DO     Date of Admission: 2021  Carter 16 tablet  Refills: 0     Doxycycline Hyclate 100 MG Tabs  Commonly known as: VIBRAMYCIN      Take 1 tablet (100 mg total) by mouth 2 (two) times daily for 8 days.    Stop taking on: December 12, 2021  Quantity: 16 tablet  Refills: 0        CHANGE how you t appointment:   Nahum Cassidy [address] [facility]  [phone]    Schedule an appointment as soon as possible for a visit  Follow up after hospitalization and to check in on antibiotic course    Mary Bruce MD

## 2021-12-07 NOTE — PAYOR COMM NOTE
--------------  DISCHARGE REVIEW    Payor: [employer] PPO  Subscriber #:  [id number]  Authorization Number: [id number]    Admit date: 12/2/21  Admit time:  10:50 AM  Discharge Date: 12/4/2021  4:21 PM     Admitting Physician: Kathy Ceja MD  At doxycycline. Lace+ Score: 46  59-90 High Risk  29-58 Medium Risk  0-28   Low Risk         TCM Follow-Up Recommendation:  LACE 29-58:  Moderate Risk of readmission after discharge from the hospital.    Procedures during hospitalization:   • None    Incide bedtime. Refills: 0     SITagliptin Phosphate 100 MG Tabs  Commonly known as: JANUVIA      Take 100 mg by mouth daily. Refills: 0     warfarin 2 MG Tabs  Commonly known as: COUMADIN      Take 2 mg by mouth 4 (four) times a week.  Mon , wed, fri ,sun   R

## 2021-12-14 NOTE — PAYOR COMM NOTE
--------------  ADMISSION REVIEW     Payor: [employer] MED[employer] ADV PPO  Subscriber #:  [id number]  Authorization Number: [id number]    Admit date: 12/2/21  Admit time: 10:50 AM       REVIEW DOCUMENTATION:     ED Provider Notes      ED Provider Notes signed by Physical Exam    GENERAL: Patient is awake, alert. HEENT: no scleral icterus. Mucous membranes are moist, oropharynx is clear, uvula midline. Scalp is atraumatic. NECK: Neck is supple, there is no nuchal rigidity.     HEART: Irregular irregula Platelet.   Procedure                               Abnormality         Status                     ---------                               -----------         ------                     CBC W/ DIFFERENTIAL[[id number]]          Abnormal            Final resul overload      Disposition:  Admit  12/2/2021  9:36 am        Hospital Problems             Present on Admission  Date Reviewed: 7/7/2021          ICD-10-CM Noted POA    * (Principal) Pneumonia due to infectious organism J18.9 12/2/2021 Unknown wheezes. No rhonchi. Cardiovascular: S1, S2. Regular rate and rhythm. No murmurs, rubs or gallops. Equal pulses. Chest and Back: No tenderness or deformity. Abdomen: Soft, nontender, nondistended. Positive bowel sounds.  No rebound, guarding or organom atenolol and Rythmol  for rate control and will continue Coumadin for anticoagulation. 5. Hypertension-we will continue on losartan and atenolol. 6. Hyperthyroidism-we will continue on methimazole.     Quality:  · DVT Prophylaxis: Coumadin  · CODE status: tab 2 mg, 2 mg, Oral, Once per day on Sun Mon Wed Fri  •  warfarin (COUMADIN) tab 4 mg, 4 mg, Oral, Once per day on Tue Thu Sat  •  glucose (DEX4) oral liquid 15 g, 15 g, Oral, Q15 Min PRN **OR** glucose-vitamin C (DEX-4) chewable tab 4 tablet, 4 tablet, O Readings from Last 3 Encounters:  12/02/21 : 140 lb (63.5 kg)  07/02/21 : 145 lb (65.8 kg)  05/04/21 : 142 lb (64.4 kg)        Physical Exam:    General: Alert and oriented x 3. No apparent distress. No respiratory or constitutional distress.   HEENT: Normo Nicko Fontana MD  12/2/2021    XR CHEST AP PORTABLE  (CPT=71045) [[id number]]  Resulted: 12/02/21 0907, Result status: Final result  Ordering provider: Jayna Baker DO  12/02/21 0839 Resulted by: Blanca Hart MD   Performed: 12/02/21 3627 - 12/02/21 9041 Pat Duty 8.3*   ALB 2.9*  --    * 141   K 3.9 3.9    110   CO2 23.0 24.0   ALKPHO 125  --    AST 15  --    ALT 21  --    BILT 2.0  --    TP 7.8  --          Estimated Creatinine Clearance: 41.9 mL/min (based on SCr of 0.99 mg/dL).          Recent Labs and atenolol. 7. Hyperthyroidism-we will continue on methimazole.           Plan of care: Continue IV antibiotics awaiting culture results.   PT/OT to evaluate.     Plan of care discussed with patient at bedside.     Digna Camp DO     Suppl syncope.        Brief Synopsis: Patient was admitted to the [place] [address] unit. Patient had 1 dose of Lasix in the ER. Patient was continued on IV antibiotics with cultures came back negative.   Patient was able to be weaned down to room air with negative cultu Tb24  Commonly known as: GLUCOPHAGE-XR       Take 500 mg by mouth 2 (two) times a day.    Refills: 0      methimazole 5 MG Tabs  Commonly known as: TAPAZOLE       Take 5 mg by mouth daily.    Refills: 0      Multi-Vitamin/Minerals Tabs       Take 1 tablet No focal neurological deficits. Musculoskeletal: Moves all extremities. Extremities: No edema.   -----------------------------------------------------------------------------------------------  PATIENT DISCHARGE INSTRUCTIONS: See electronic chart     Enoch Davies

## 2021-12-15 NOTE — PAYOR COMM NOTE
--------------  DISCHARGE REVIEW    Payor: [employer] P[employer]  Subscriber #:  [id number]  Authorization Number: [id number]    Admit date: 12/2/21  Admit time:  10:50 AM  Discharge Date: 12/4/2021  4:21 PM     Admitting Physician: Pavan Fournier MD  At doxycycline. Lace+ Score: 46  59-90 High Risk  29-58 Medium Risk  0-28   Low Risk         TCM Follow-Up Recommendation:  LACE 29-58:  Moderate Risk of readmission after discharge from the hospital.    Procedures during hospitalization:   • None    Incide bedtime. Refills: 0     SITagliptin Phosphate 100 MG Tabs  Commonly known as: JANUVIA      Take 100 mg by mouth daily. Refills: 0     warfarin 2 MG Tabs  Commonly known as: COUMADIN      Take 2 mg by mouth 4 (four) times a week.  Mon , wed, fri ,sun   R

## 2022-08-23 ENCOUNTER — HOSPITAL ENCOUNTER (EMERGENCY)
Facility: HOSPITAL | Age: 87
Discharge: HOME | End: 2022-08-24
Attending: EMERGENCY MEDICINE
Payer: MEDICARE

## 2022-08-23 DIAGNOSIS — R04.0 EPISTAXIS: Primary | ICD-10-CM

## 2022-08-23 PROCEDURE — 30903 CONTROL OF NOSEBLEED: CPT

## 2022-08-23 PROCEDURE — 99284 EMERGENCY DEPT VISIT MOD MDM: CPT

## 2022-08-23 PROCEDURE — 36415 COLL VENOUS BLD VENIPUNCTURE: CPT

## 2022-08-23 RX ORDER — TRANEXAMIC ACID 100 MG/ML
INJECTION, SOLUTION INTRAVENOUS
Status: COMPLETED
Start: 2022-08-23 | End: 2022-08-23

## 2022-08-24 VITALS
SYSTOLIC BLOOD PRESSURE: 161 MMHG | HEART RATE: 79 BPM | TEMPERATURE: 98 F | OXYGEN SATURATION: 98 % | DIASTOLIC BLOOD PRESSURE: 87 MMHG | RESPIRATION RATE: 23 BRPM

## 2022-08-24 LAB
ALBUMIN SERPL-MCNC: 3.5 G/DL (ref 3.4–5)
ALBUMIN/GLOB SERPL: 0.8 {RATIO} (ref 1–2)
ALP LIVER SERPL-CCNC: 131 U/L
ALT SERPL-CCNC: 22 U/L
ANION GAP SERPL CALC-SCNC: 6 MMOL/L (ref 0–18)
APTT PPP: 44.1 SECONDS (ref 23.3–35.6)
AST SERPL-CCNC: 23 U/L (ref 15–37)
BASOPHILS # BLD AUTO: 0.04 X10(3) UL (ref 0–0.2)
BASOPHILS NFR BLD AUTO: 0.6 %
BILIRUB SERPL-MCNC: 0.6 MG/DL (ref 0.1–2)
BUN BLD-MCNC: 19 MG/DL (ref 7–18)
CALCIUM BLD-MCNC: 9 MG/DL (ref 8.5–10.1)
CHLORIDE SERPL-SCNC: 107 MMOL/L (ref 98–112)
CO2 SERPL-SCNC: 25 MMOL/L (ref 21–32)
CREAT BLD-MCNC: 1.37 MG/DL
EOSINOPHIL # BLD AUTO: 0.12 X10(3) UL (ref 0–0.7)
EOSINOPHIL NFR BLD AUTO: 1.7 %
ERYTHROCYTE [DISTWIDTH] IN BLOOD BY AUTOMATED COUNT: 13.5 %
GFR SERPLBLD BASED ON 1.73 SQ M-ARVRAT: 38 ML/MIN/1.73M2 (ref 60–?)
GLOBULIN PLAS-MCNC: 4.6 G/DL (ref 2.8–4.4)
GLUCOSE BLD-MCNC: 169 MG/DL (ref 70–99)
HCT VFR BLD AUTO: 42.1 %
HGB BLD-MCNC: 13.7 G/DL
IMM GRANULOCYTES # BLD AUTO: 0.02 X10(3) UL (ref 0–1)
IMM GRANULOCYTES NFR BLD: 0.3 %
INR BLD: 2.34 (ref 0.85–1.16)
LYMPHOCYTES # BLD AUTO: 2.35 X10(3) UL (ref 1–4)
LYMPHOCYTES NFR BLD AUTO: 33.8 %
MCH RBC QN AUTO: 30.5 PG (ref 26–34)
MCHC RBC AUTO-ENTMCNC: 32.5 G/DL (ref 31–37)
MCV RBC AUTO: 93.8 FL
MONOCYTES # BLD AUTO: 0.79 X10(3) UL (ref 0.1–1)
MONOCYTES NFR BLD AUTO: 11.4 %
NEUTROPHILS # BLD AUTO: 3.63 X10 (3) UL (ref 1.5–7.7)
NEUTROPHILS # BLD AUTO: 3.63 X10(3) UL (ref 1.5–7.7)
NEUTROPHILS NFR BLD AUTO: 52.2 %
OSMOLALITY SERPL CALC.SUM OF ELEC: 292 MOSM/KG (ref 275–295)
PLATELET # BLD AUTO: 121 10(3)UL (ref 150–450)
POTASSIUM SERPL-SCNC: 3.8 MMOL/L (ref 3.5–5.1)
PROT SERPL-MCNC: 8.1 G/DL (ref 6.4–8.2)
PROTHROMBIN TIME: 25.4 SECONDS (ref 11.6–14.8)
RBC # BLD AUTO: 4.49 X10(6)UL
SODIUM SERPL-SCNC: 138 MMOL/L (ref 136–145)
WBC # BLD AUTO: 7 X10(3) UL (ref 4–11)

## 2022-08-24 PROCEDURE — 85610 PROTHROMBIN TIME: CPT | Performed by: EMERGENCY MEDICINE

## 2022-08-24 PROCEDURE — 80053 COMPREHEN METABOLIC PANEL: CPT | Performed by: EMERGENCY MEDICINE

## 2022-08-24 PROCEDURE — 85730 THROMBOPLASTIN TIME PARTIAL: CPT | Performed by: EMERGENCY MEDICINE

## 2022-08-24 PROCEDURE — 85025 COMPLETE CBC W/AUTO DIFF WBC: CPT | Performed by: EMERGENCY MEDICINE

## 2022-08-24 RX ORDER — TRANEXAMIC ACID 100 MG/ML
5 INJECTION, SOLUTION INTRAVENOUS ONCE
Status: COMPLETED | OUTPATIENT
Start: 2022-08-23 | End: 2022-08-23

## 2023-11-12 ENCOUNTER — HOSPITAL ENCOUNTER (OUTPATIENT)
Facility: HOSPITAL | Age: 88
Setting detail: OBSERVATION
Discharge: NURSING, REHAB OR LONG-TERM CARE | End: 2023-11-13
Attending: EMERGENCY MEDICINE | Admitting: HOSPITALIST
Payer: MEDICARE

## 2023-11-12 DIAGNOSIS — R04.0 EPISTAXIS: Primary | ICD-10-CM

## 2023-11-12 DIAGNOSIS — R55 SYNCOPE, NEAR: ICD-10-CM

## 2023-11-12 PROBLEM — I48.11 LONGSTANDING PERSISTENT ATRIAL FIBRILLATION (HCC): Status: ACTIVE | Noted: 2023-11-12

## 2023-11-12 PROBLEM — E05.90 HYPERTHYROIDISM: Status: ACTIVE | Noted: 2023-11-12

## 2023-11-12 LAB
BASOPHILS # BLD AUTO: 0.04 X10(3) UL (ref 0–0.2)
BASOPHILS # BLD AUTO: 0.05 X10(3) UL (ref 0–0.2)
BASOPHILS NFR BLD AUTO: 0.5 %
BASOPHILS NFR BLD AUTO: 0.6 %
EOSINOPHIL # BLD AUTO: 0.01 X10(3) UL (ref 0–0.7)
EOSINOPHIL # BLD AUTO: 0.17 X10(3) UL (ref 0–0.7)
EOSINOPHIL NFR BLD AUTO: 0.1 %
EOSINOPHIL NFR BLD AUTO: 2 %
ERYTHROCYTE [DISTWIDTH] IN BLOOD BY AUTOMATED COUNT: 13.6 %
ERYTHROCYTE [DISTWIDTH] IN BLOOD BY AUTOMATED COUNT: 13.8 %
EST. AVERAGE GLUCOSE BLD GHB EST-MCNC: 160 MG/DL (ref 68–126)
GLUCOSE BLD-MCNC: 122 MG/DL (ref 70–99)
GLUCOSE BLD-MCNC: 136 MG/DL (ref 70–99)
GLUCOSE BLD-MCNC: 161 MG/DL (ref 70–99)
HBA1C MFR BLD: 7.2 % (ref ?–5.7)
HCT VFR BLD AUTO: 39.3 %
HCT VFR BLD AUTO: 42.2 %
HGB BLD-MCNC: 12.5 G/DL
HGB BLD-MCNC: 13.5 G/DL
IMM GRANULOCYTES # BLD AUTO: 0.03 X10(3) UL (ref 0–1)
IMM GRANULOCYTES # BLD AUTO: 0.03 X10(3) UL (ref 0–1)
IMM GRANULOCYTES NFR BLD: 0.4 %
IMM GRANULOCYTES NFR BLD: 0.4 %
INR BLD: 3.15 (ref 0.8–1.2)
LYMPHOCYTES # BLD AUTO: 2.05 X10(3) UL (ref 1–4)
LYMPHOCYTES # BLD AUTO: 2.18 X10(3) UL (ref 1–4)
LYMPHOCYTES NFR BLD AUTO: 24.3 %
LYMPHOCYTES NFR BLD AUTO: 26 %
MCH RBC QN AUTO: 30 PG (ref 26–34)
MCH RBC QN AUTO: 30 PG (ref 26–34)
MCHC RBC AUTO-ENTMCNC: 31.8 G/DL (ref 31–37)
MCHC RBC AUTO-ENTMCNC: 32 G/DL (ref 31–37)
MCV RBC AUTO: 93.8 FL
MCV RBC AUTO: 94.5 FL
MONOCYTES # BLD AUTO: 0.48 X10(3) UL (ref 0.1–1)
MONOCYTES # BLD AUTO: 0.65 X10(3) UL (ref 0.1–1)
MONOCYTES NFR BLD AUTO: 5.7 %
MONOCYTES NFR BLD AUTO: 7.8 %
NEUTROPHILS # BLD AUTO: 5.29 X10 (3) UL (ref 1.5–7.7)
NEUTROPHILS # BLD AUTO: 5.29 X10(3) UL (ref 1.5–7.7)
NEUTROPHILS # BLD AUTO: 5.83 X10 (3) UL (ref 1.5–7.7)
NEUTROPHILS # BLD AUTO: 5.83 X10(3) UL (ref 1.5–7.7)
NEUTROPHILS NFR BLD AUTO: 63.2 %
NEUTROPHILS NFR BLD AUTO: 69 %
PLATELET # BLD AUTO: 124 10(3)UL (ref 150–450)
PLATELET # BLD AUTO: 124 10(3)UL (ref 150–450)
PROTHROMBIN TIME: 32.8 SECONDS (ref 11.6–14.8)
RBC # BLD AUTO: 4.16 X10(6)UL
RBC # BLD AUTO: 4.5 X10(6)UL
WBC # BLD AUTO: 8.4 X10(3) UL (ref 4–11)
WBC # BLD AUTO: 8.4 X10(3) UL (ref 4–11)

## 2023-11-12 PROCEDURE — 99223 1ST HOSP IP/OBS HIGH 75: CPT | Performed by: HOSPITALIST

## 2023-11-12 RX ORDER — TRANEXAMIC ACID 100 MG/ML
5 INJECTION, SOLUTION INTRAVENOUS ONCE
Status: COMPLETED | OUTPATIENT
Start: 2023-11-12 | End: 2023-11-12

## 2023-11-12 RX ORDER — ENEMA 19; 7 G/133ML; G/133ML
1 ENEMA RECTAL ONCE AS NEEDED
Status: DISCONTINUED | OUTPATIENT
Start: 2023-11-12 | End: 2023-11-13

## 2023-11-12 RX ORDER — ATENOLOL 25 MG/1
25 TABLET ORAL ONCE
Status: DISCONTINUED | OUTPATIENT
Start: 2023-11-12 | End: 2023-11-13

## 2023-11-12 RX ORDER — SENNOSIDES 8.6 MG
17.2 TABLET ORAL NIGHTLY PRN
Status: DISCONTINUED | OUTPATIENT
Start: 2023-11-12 | End: 2023-11-13

## 2023-11-12 RX ORDER — NICOTINE POLACRILEX 4 MG
15 LOZENGE BUCCAL
Status: DISCONTINUED | OUTPATIENT
Start: 2023-11-12 | End: 2023-11-13

## 2023-11-12 RX ORDER — TRAMADOL HYDROCHLORIDE 50 MG/1
100 TABLET ORAL EVERY 6 HOURS PRN
Status: DISCONTINUED | OUTPATIENT
Start: 2023-11-12 | End: 2023-11-13

## 2023-11-12 RX ORDER — MELATONIN
3 NIGHTLY PRN
Status: DISCONTINUED | OUTPATIENT
Start: 2023-11-12 | End: 2023-11-13

## 2023-11-12 RX ORDER — SODIUM CHLORIDE 9 MG/ML
INJECTION, SOLUTION INTRAVENOUS CONTINUOUS
Status: ACTIVE | OUTPATIENT
Start: 2023-11-12 | End: 2023-11-13

## 2023-11-12 RX ORDER — METOCLOPRAMIDE HYDROCHLORIDE 5 MG/ML
10 INJECTION INTRAMUSCULAR; INTRAVENOUS EVERY 8 HOURS PRN
Status: DISCONTINUED | OUTPATIENT
Start: 2023-11-12 | End: 2023-11-13

## 2023-11-12 RX ORDER — GLIMEPIRIDE 2 MG/1
2 TABLET ORAL
COMMUNITY

## 2023-11-12 RX ORDER — BISACODYL 10 MG
10 SUPPOSITORY, RECTAL RECTAL
Status: DISCONTINUED | OUTPATIENT
Start: 2023-11-12 | End: 2023-11-13

## 2023-11-12 RX ORDER — HYDROMORPHONE HYDROCHLORIDE 1 MG/ML
0.5 INJECTION, SOLUTION INTRAMUSCULAR; INTRAVENOUS; SUBCUTANEOUS EVERY 30 MIN PRN
Status: ACTIVE | OUTPATIENT
Start: 2023-11-12 | End: 2023-11-12

## 2023-11-12 RX ORDER — NICOTINE POLACRILEX 4 MG
30 LOZENGE BUCCAL
Status: DISCONTINUED | OUTPATIENT
Start: 2023-11-12 | End: 2023-11-13

## 2023-11-12 RX ORDER — METHIMAZOLE 5 MG/1
5 TABLET ORAL DAILY
Status: DISCONTINUED | OUTPATIENT
Start: 2023-11-12 | End: 2023-11-13

## 2023-11-12 RX ORDER — POLYETHYLENE GLYCOL 3350 17 G/17G
17 POWDER, FOR SOLUTION ORAL DAILY PRN
Status: DISCONTINUED | OUTPATIENT
Start: 2023-11-12 | End: 2023-11-13

## 2023-11-12 RX ORDER — AMOXICILLIN AND CLAVULANATE POTASSIUM 875; 125 MG/1; MG/1
875 TABLET, FILM COATED ORAL EVERY 12 HOURS SCHEDULED
Qty: 12 TABLET | Refills: 0 | Status: DISCONTINUED | OUTPATIENT
Start: 2023-11-13 | End: 2023-11-13

## 2023-11-12 RX ORDER — TRAMADOL HYDROCHLORIDE 50 MG/1
50 TABLET ORAL EVERY 6 HOURS PRN
Status: DISCONTINUED | OUTPATIENT
Start: 2023-11-12 | End: 2023-11-13

## 2023-11-12 RX ORDER — SODIUM CHLORIDE 9 MG/ML
INJECTION, SOLUTION INTRAVENOUS CONTINUOUS
Status: ACTIVE | OUTPATIENT
Start: 2023-11-12 | End: 2023-11-12

## 2023-11-12 RX ORDER — ONDANSETRON 2 MG/ML
4 INJECTION INTRAMUSCULAR; INTRAVENOUS EVERY 6 HOURS PRN
Status: DISCONTINUED | OUTPATIENT
Start: 2023-11-12 | End: 2023-11-13

## 2023-11-12 RX ORDER — ACETAMINOPHEN 325 MG/1
650 TABLET ORAL EVERY 6 HOURS PRN
Status: DISCONTINUED | OUTPATIENT
Start: 2023-11-12 | End: 2023-11-13

## 2023-11-12 RX ORDER — ONDANSETRON 2 MG/ML
4 INJECTION INTRAMUSCULAR; INTRAVENOUS EVERY 4 HOURS PRN
Status: ACTIVE | OUTPATIENT
Start: 2023-11-12 | End: 2023-11-12

## 2023-11-12 RX ORDER — DEXTROSE MONOHYDRATE 25 G/50ML
50 INJECTION, SOLUTION INTRAVENOUS
Status: DISCONTINUED | OUTPATIENT
Start: 2023-11-12 | End: 2023-11-13

## 2023-11-12 RX ORDER — ATENOLOL 25 MG/1
50 TABLET ORAL 2 TIMES DAILY
Status: DISCONTINUED | OUTPATIENT
Start: 2023-11-13 | End: 2023-11-13

## 2023-11-12 RX ORDER — AMOXICILLIN AND CLAVULANATE POTASSIUM 875; 125 MG/1; MG/1
875 TABLET, FILM COATED ORAL EVERY 12 HOURS SCHEDULED
Status: DISCONTINUED | OUTPATIENT
Start: 2023-11-12 | End: 2023-11-12

## 2023-11-12 RX ORDER — ECHINACEA PURPUREA EXTRACT 125 MG
2 TABLET ORAL 3 TIMES DAILY
Status: DISCONTINUED | OUTPATIENT
Start: 2023-11-12 | End: 2023-11-13

## 2023-11-12 RX ORDER — VIT C/E/ZN/COPPR/LUTEIN/ZEAXAN 250 MG-2MG
1 CAPSULE ORAL 2 TIMES DAILY
COMMUNITY

## 2023-11-12 NOTE — ED INITIAL ASSESSMENT (HPI)
Patient reports she was watching tv and her nose started to bleed at 0410. No trauma.  Patient is on coumadin

## 2023-11-12 NOTE — ED QUICK NOTES
Patient attempted to transfer from bed to [facility] with PCT, patient became dizzy and placed back in bed. Hypotension noted, patient placed on monitor and IV established. IVF infusing per MAR.

## 2023-11-13 VITALS
DIASTOLIC BLOOD PRESSURE: 78 MMHG | HEIGHT: 69 IN | TEMPERATURE: 98 F | HEART RATE: 80 BPM | RESPIRATION RATE: 17 BRPM | OXYGEN SATURATION: 96 % | SYSTOLIC BLOOD PRESSURE: 148 MMHG | WEIGHT: 164.69 LBS | BODY MASS INDEX: 24.39 KG/M2

## 2023-11-13 LAB
ANION GAP SERPL CALC-SCNC: 3 MMOL/L (ref 0–18)
BASOPHILS # BLD AUTO: 0.05 X10(3) UL (ref 0–0.2)
BASOPHILS NFR BLD AUTO: 0.6 %
BUN BLD-MCNC: 40 MG/DL (ref 9–23)
CALCIUM BLD-MCNC: 8.3 MG/DL (ref 8.5–10.1)
CHLORIDE SERPL-SCNC: 114 MMOL/L (ref 98–112)
CO2 SERPL-SCNC: 26 MMOL/L (ref 21–32)
CREAT BLD-MCNC: 1.18 MG/DL
EGFRCR SERPLBLD CKD-EPI 2021: 45 ML/MIN/1.73M2 (ref 60–?)
EOSINOPHIL # BLD AUTO: 0.07 X10(3) UL (ref 0–0.7)
EOSINOPHIL NFR BLD AUTO: 0.8 %
ERYTHROCYTE [DISTWIDTH] IN BLOOD BY AUTOMATED COUNT: 14.1 %
GLUCOSE BLD-MCNC: 122 MG/DL (ref 70–99)
GLUCOSE BLD-MCNC: 136 MG/DL (ref 70–99)
GLUCOSE BLD-MCNC: 151 MG/DL (ref 70–99)
HCT VFR BLD AUTO: 33.4 %
HGB BLD-MCNC: 10.8 G/DL
IMM GRANULOCYTES # BLD AUTO: 0.03 X10(3) UL (ref 0–1)
IMM GRANULOCYTES NFR BLD: 0.3 %
LYMPHOCYTES # BLD AUTO: 2.68 X10(3) UL (ref 1–4)
LYMPHOCYTES NFR BLD AUTO: 30.1 %
MCH RBC QN AUTO: 29.5 PG (ref 26–34)
MCHC RBC AUTO-ENTMCNC: 32.3 G/DL (ref 31–37)
MCV RBC AUTO: 91.3 FL
MONOCYTES # BLD AUTO: 0.88 X10(3) UL (ref 0.1–1)
MONOCYTES NFR BLD AUTO: 9.9 %
NEUTROPHILS # BLD AUTO: 5.2 X10 (3) UL (ref 1.5–7.7)
NEUTROPHILS # BLD AUTO: 5.2 X10(3) UL (ref 1.5–7.7)
NEUTROPHILS NFR BLD AUTO: 58.3 %
OSMOLALITY SERPL CALC.SUM OF ELEC: 308 MOSM/KG (ref 275–295)
PLATELET # BLD AUTO: 103 10(3)UL (ref 150–450)
POTASSIUM SERPL-SCNC: 3.9 MMOL/L (ref 3.5–5.1)
RBC # BLD AUTO: 3.66 X10(6)UL
SODIUM SERPL-SCNC: 143 MMOL/L (ref 136–145)
WBC # BLD AUTO: 8.9 X10(3) UL (ref 4–11)

## 2023-11-13 PROCEDURE — 99239 HOSP IP/OBS DSCHRG MGMT >30: CPT | Performed by: HOSPITALIST

## 2023-11-13 RX ORDER — AMOXICILLIN AND CLAVULANATE POTASSIUM 875; 125 MG/1; MG/1
875 TABLET, FILM COATED ORAL EVERY 12 HOURS SCHEDULED
Qty: 12 TABLET | Refills: 0 | Status: SHIPPED | OUTPATIENT
Start: 2023-11-13

## 2023-11-13 RX ORDER — TRAMADOL HYDROCHLORIDE 50 MG/1
50 TABLET ORAL EVERY 6 HOURS PRN
Qty: 20 TABLET | Refills: 0 | Status: SHIPPED | OUTPATIENT
Start: 2023-11-13

## 2023-11-13 RX ORDER — ECHINACEA PURPUREA EXTRACT 125 MG
2 TABLET ORAL 3 TIMES DAILY
Status: SHIPPED | COMMUNITY
Start: 2023-11-13

## 2023-11-13 NOTE — PLAN OF CARE
Pt A&OX4, room air  A-fib on tele. Breath sound clear, diminished. Bm today, Margot Clara in place. R nare with rhinorocket. Small amount of pink-tinged drainage from L nare. Fall risk precaution, bed alarm on and call light within reach. Coumadin hold, monitor hgb   0.9 NS infusing as order. Discussed POC with pt, questions answer at this time     Problem: Diabetes/Glucose Control  Goal: Glucose maintained within prescribed range  Description: INTERVENTIONS:  - Monitor Blood Glucose as ordered  - Assess for signs and symptoms of hyperglycemia and hypoglycemia  - Administer ordered medications to maintain glucose within target range  - Assess barriers to adequate nutritional intake and initiate nutrition consult as needed  - Instruct patient on self management of diabetes  Outcome: Progressing     Problem: Patient/Family Goals  Goal: Patient/Family Long Term Goal  Description: Patient's Long Term Goal: stay out of hospital    Interventions:  - Follow up MD.  -Take coumadin as order for A-FIB. -Blood sugar control.  - See additional Care Plan goals for specific interventions  Outcome: Progressing  Goal: Patient/Family Short Term Goal  Description: Patient's Short Term Goal: stop bleeding, treat dizziness    Interventions:   - Hold coumadin.   -CBC lab watch Hbg  -IV fluid for hypotension  - See additional Care Plan goals for specific interventions  Outcome: Progressing

## 2023-11-13 NOTE — PLAN OF CARE
Received pt at 0700. Pt is A&Ox4, forgetful, hard of hearing. Rhinorocket present at R nare. Pt is on room air, lungs are clear/diminished, no coughing. She is in A fib, no complaints of chest pain. She has a purewick in place, abdomen non-tender, last BM 11-12. Pt updated with plan of care. Approx 1315- spoke with Dr Telly Armenta- ok for discharge, no active drainage, Rhinorocket still in R nare to remain until follow up on Wednesday or Thursday, relayed to daughter & assisted living facility. Problem: Diabetes/Glucose Control  Goal: Glucose maintained within prescribed range  Description: INTERVENTIONS:  - Monitor Blood Glucose as ordered  - Assess for signs and symptoms of hyperglycemia and hypoglycemia  - Administer ordered medications to maintain glucose within target range  - Assess barriers to adequate nutritional intake and initiate nutrition consult as needed  - Instruct patient on self management of diabetes  Outcome: Progressing     Problem: Patient/Family Goals  Goal: Patient/Family Long Term Goal  Description: Patient's Long Term Goal: stay out of hospital    Interventions:  - Follow up MD.  -Take coumadin as order for A-FIB. -Blood sugar control.    - See additional Care Plan goals for specific interventions  Outcome: Progressing  Goal: Patient/Family Short Term Goal  Description: Patient's Short Term Goal: stop bleeding, treat dizziness  No pain 11-13    Interventions:   - Hold coumadin.   -CBC lab watch Hbg  -IV fluid for hypotension  - PRN meds, hot/cold packs, tell nurse if in pain    - See additional Care Plan goals for specific interventions  Outcome: Progressing

## 2023-11-13 NOTE — CM/SW NOTE
Spoke with AVIS Peterson @ [facility]  to inform her of patient's retrurn.   Nurse to call above number and ask for nurse [place]; daughter to transport her

## 2023-11-13 NOTE — PROGRESS NOTES
11/13/23 0844 11/13/23 0845 11/13/23 0847   Vital Signs   /69 138/78 122/63   MAP (mmHg) 84 94 76   BP Location Right arm Right arm Right arm   BP Method Automatic Automatic Automatic   Patient Position Lying Sitting Standing      Orthos. Pt denies dizziness/lightheadedness. Clear bilaterally, pupils equal, round and reactive to light, EOMI.

## 2023-11-13 NOTE — PLAN OF CARE
Pt is ok to discharge per primary and consults. Discharge instructions including meds & follow ups given. Patient verbalizes understanding & questions answered. IV removed, tele monitor discontinued, all belongings taken with patient. Rhinorocket in R nare- keep in per ENT. Report given to RADHA Fuchs @ [facility]. Pt transported off unit via wheelchair to [facility].

## 2023-12-23 ENCOUNTER — HOSPITAL ENCOUNTER (EMERGENCY)
Facility: HOSPITAL | Age: 88
Discharge: HOME | End: 2023-12-23
Attending: EMERGENCY MEDICINE
Payer: MEDICARE

## 2023-12-23 VITALS
SYSTOLIC BLOOD PRESSURE: 153 MMHG | HEIGHT: 68 IN | TEMPERATURE: 98 F | DIASTOLIC BLOOD PRESSURE: 83 MMHG | BODY MASS INDEX: 24.25 KG/M2 | HEART RATE: 80 BPM | RESPIRATION RATE: 18 BRPM | WEIGHT: 160 LBS | OXYGEN SATURATION: 100 %

## 2023-12-23 DIAGNOSIS — R04.0 EPISTAXIS: Primary | ICD-10-CM

## 2023-12-23 LAB
BASOPHILS # BLD AUTO: 0.04 X10(3) UL (ref 0–0.2)
BASOPHILS NFR BLD AUTO: 0.6 %
EOSINOPHIL # BLD AUTO: 0.12 X10(3) UL (ref 0–0.7)
EOSINOPHIL NFR BLD AUTO: 1.7 %
ERYTHROCYTE [DISTWIDTH] IN BLOOD BY AUTOMATED COUNT: 13.6 %
HCT VFR BLD AUTO: 39.5 %
HGB BLD-MCNC: 12.6 G/DL
IMM GRANULOCYTES # BLD AUTO: 0.03 X10(3) UL (ref 0–1)
IMM GRANULOCYTES NFR BLD: 0.4 %
INR BLD: 2.4 (ref 0.8–1.2)
LYMPHOCYTES # BLD AUTO: 1.17 X10(3) UL (ref 1–4)
LYMPHOCYTES NFR BLD AUTO: 16.1 %
MCH RBC QN AUTO: 29.6 PG (ref 26–34)
MCHC RBC AUTO-ENTMCNC: 31.9 G/DL (ref 31–37)
MCV RBC AUTO: 92.7 FL
MONOCYTES # BLD AUTO: 0.63 X10(3) UL (ref 0.1–1)
MONOCYTES NFR BLD AUTO: 8.7 %
NEUTROPHILS # BLD AUTO: 5.28 X10 (3) UL (ref 1.5–7.7)
NEUTROPHILS # BLD AUTO: 5.28 X10(3) UL (ref 1.5–7.7)
NEUTROPHILS NFR BLD AUTO: 72.5 %
PLATELET # BLD AUTO: 138 10(3)UL (ref 150–450)
PROTHROMBIN TIME: 26.4 SECONDS (ref 11.6–14.8)
RBC # BLD AUTO: 4.26 X10(6)UL
WBC # BLD AUTO: 7.3 X10(3) UL (ref 4–11)

## 2023-12-23 PROCEDURE — 99284 EMERGENCY DEPT VISIT MOD MDM: CPT

## 2023-12-23 PROCEDURE — 36415 COLL VENOUS BLD VENIPUNCTURE: CPT

## 2023-12-23 PROCEDURE — 99283 EMERGENCY DEPT VISIT LOW MDM: CPT

## 2023-12-23 PROCEDURE — 30903 CONTROL OF NOSEBLEED: CPT

## 2023-12-23 PROCEDURE — 85025 COMPLETE CBC W/AUTO DIFF WBC: CPT | Performed by: EMERGENCY MEDICINE

## 2023-12-23 PROCEDURE — 85610 PROTHROMBIN TIME: CPT | Performed by: EMERGENCY MEDICINE

## 2023-12-23 RX ORDER — WARFARIN SODIUM 2 MG/1
2 TABLET ORAL DAILY
COMMUNITY

## 2023-12-23 RX ORDER — WARFARIN SODIUM 5 MG/1
5 TABLET ORAL DAILY
COMMUNITY

## 2023-12-23 RX ORDER — CEPHALEXIN 500 MG/1
500 CAPSULE ORAL 2 TIMES DAILY
Qty: 10 CAPSULE | Refills: 0 | Status: SHIPPED | OUTPATIENT
Start: 2023-12-23 | End: 2024-01-02

## 2023-12-23 NOTE — ED INITIAL ASSESSMENT (HPI)
Pt presents to ED via EMS from [facility] for a nose bleed that started 30 mins ago out of the blue. Denies trauma/injury. Pt has had issues with nosebleeds in the past. Pt denies dizziness. Pt is on warfarin. Nose clip in place by ems. Pt states maybe one clot came out of right nostril. Pt is a/ox4.

## 2023-12-23 NOTE — DISCHARGE INSTRUCTIONS
Keep packing in place till seen by ENT. Follow-up with your PCP and ENT next week. Return if bleeding recurs or new symptoms develop.

## 2023-12-25 ENCOUNTER — HOSPITAL ENCOUNTER (EMERGENCY)
Facility: HOSPITAL | Age: 88
Discharge: HOME | End: 2023-12-26
Attending: EMERGENCY MEDICINE
Payer: MEDICARE

## 2023-12-25 DIAGNOSIS — D64.9 ANEMIA, UNSPECIFIED TYPE: Primary | ICD-10-CM

## 2023-12-25 DIAGNOSIS — I10 HYPERTENSION, UNSPECIFIED TYPE: ICD-10-CM

## 2023-12-25 DIAGNOSIS — R04.0 EPISTAXIS: ICD-10-CM

## 2023-12-25 PROCEDURE — 85730 THROMBOPLASTIN TIME PARTIAL: CPT | Performed by: EMERGENCY MEDICINE

## 2023-12-25 PROCEDURE — 99284 EMERGENCY DEPT VISIT MOD MDM: CPT

## 2023-12-25 PROCEDURE — 85025 COMPLETE CBC W/AUTO DIFF WBC: CPT | Performed by: EMERGENCY MEDICINE

## 2023-12-25 PROCEDURE — 85610 PROTHROMBIN TIME: CPT | Performed by: EMERGENCY MEDICINE

## 2023-12-25 RX ORDER — TRANEXAMIC ACID 100 MG/ML
INJECTION, SOLUTION INTRAVENOUS
Status: COMPLETED
Start: 2023-12-25 | End: 2023-12-25

## 2023-12-25 RX ORDER — OXYMETAZOLINE HYDROCHLORIDE 0.05 G/100ML
SPRAY NASAL
Status: DISCONTINUED
Start: 2023-12-25 | End: 2023-12-25 | Stop reason: WASHOUT

## 2023-12-26 VITALS
HEART RATE: 85 BPM | TEMPERATURE: 98 F | RESPIRATION RATE: 16 BRPM | SYSTOLIC BLOOD PRESSURE: 142 MMHG | DIASTOLIC BLOOD PRESSURE: 75 MMHG | OXYGEN SATURATION: 99 %

## 2023-12-26 LAB
APTT PPP: 37.2 SECONDS (ref 23.3–35.6)
BASOPHILS # BLD AUTO: 0.06 X10(3) UL (ref 0–0.2)
BASOPHILS NFR BLD AUTO: 0.7 %
EOSINOPHIL # BLD AUTO: 0.26 X10(3) UL (ref 0–0.7)
EOSINOPHIL NFR BLD AUTO: 3.1 %
ERYTHROCYTE [DISTWIDTH] IN BLOOD BY AUTOMATED COUNT: 13.8 %
HCT VFR BLD AUTO: 33.6 %
HGB BLD-MCNC: 10.9 G/DL
IMM GRANULOCYTES # BLD AUTO: 0.03 X10(3) UL (ref 0–1)
IMM GRANULOCYTES NFR BLD: 0.4 %
INR BLD: 2.27 (ref 0.8–1.2)
LYMPHOCYTES # BLD AUTO: 2.15 X10(3) UL (ref 1–4)
LYMPHOCYTES NFR BLD AUTO: 25.5 %
MCH RBC QN AUTO: 29.9 PG (ref 26–34)
MCHC RBC AUTO-ENTMCNC: 32.4 G/DL (ref 31–37)
MCV RBC AUTO: 92.3 FL
MONOCYTES # BLD AUTO: 0.8 X10(3) UL (ref 0.1–1)
MONOCYTES NFR BLD AUTO: 9.5 %
NEUTROPHILS # BLD AUTO: 5.12 X10 (3) UL (ref 1.5–7.7)
NEUTROPHILS # BLD AUTO: 5.12 X10(3) UL (ref 1.5–7.7)
NEUTROPHILS NFR BLD AUTO: 60.8 %
PLATELET # BLD AUTO: 131 10(3)UL (ref 150–450)
PROTHROMBIN TIME: 25.3 SECONDS (ref 11.6–14.8)
RBC # BLD AUTO: 3.64 X10(6)UL
WBC # BLD AUTO: 8.4 X10(3) UL (ref 4–11)

## 2023-12-26 PROCEDURE — 96376 TX/PRO/DX INJ SAME DRUG ADON: CPT

## 2023-12-26 PROCEDURE — 96374 THER/PROPH/DIAG INJ IV PUSH: CPT

## 2023-12-26 RX ORDER — HYDRALAZINE HYDROCHLORIDE 20 MG/ML
10 INJECTION INTRAMUSCULAR; INTRAVENOUS ONCE
Status: COMPLETED | OUTPATIENT
Start: 2023-12-26 | End: 2023-12-26

## 2023-12-26 RX ORDER — HYDRALAZINE HYDROCHLORIDE 20 MG/ML
5 INJECTION INTRAMUSCULAR; INTRAVENOUS ONCE
Status: COMPLETED | OUTPATIENT
Start: 2023-12-26 | End: 2023-12-26

## 2023-12-26 RX ORDER — ACETAMINOPHEN 500 MG
1000 TABLET ORAL ONCE
Status: COMPLETED | OUTPATIENT
Start: 2023-12-26 | End: 2023-12-26

## 2023-12-26 RX ORDER — TRANEXAMIC ACID 100 MG/ML
5 INJECTION, SOLUTION INTRAVENOUS ONCE
Status: COMPLETED | OUTPATIENT
Start: 2023-12-26 | End: 2023-12-25

## 2023-12-26 RX ORDER — ACETAMINOPHEN 500 MG
1000 TABLET ORAL ONCE
Status: DISCONTINUED | OUTPATIENT
Start: 2023-12-26 | End: 2023-12-26

## 2025-03-11 ENCOUNTER — HOSPITAL ENCOUNTER (OUTPATIENT)
Facility: HOSPITAL | Age: 89
Setting detail: OBSERVATION
Discharge: NURSING, REHAB OR LONG-TERM CARE | End: 2025-03-13
Attending: EMERGENCY MEDICINE | Admitting: HOSPITALIST
Payer: MEDICARE

## 2025-03-11 ENCOUNTER — APPOINTMENT (OUTPATIENT)
Dept: CT IMAGING | Facility: HOSPITAL | Age: 89
End: 2025-03-11
Attending: EMERGENCY MEDICINE
Payer: MEDICARE

## 2025-03-11 ENCOUNTER — APPOINTMENT (OUTPATIENT)
Dept: GENERAL RADIOLOGY | Facility: HOSPITAL | Age: 89
End: 2025-03-11
Attending: EMERGENCY MEDICINE
Payer: MEDICARE

## 2025-03-11 DIAGNOSIS — E86.0 DEHYDRATION: ICD-10-CM

## 2025-03-11 DIAGNOSIS — U07.1 COVID-19: Primary | ICD-10-CM

## 2025-03-11 DIAGNOSIS — R53.1 WEAKNESS GENERALIZED: ICD-10-CM

## 2025-03-11 DIAGNOSIS — R40.0 SOMNOLENCE: ICD-10-CM

## 2025-03-11 DIAGNOSIS — N30.00 ACUTE CYSTITIS WITHOUT HEMATURIA: ICD-10-CM

## 2025-03-11 LAB
ALBUMIN SERPL-MCNC: 4.3 G/DL (ref 3.2–4.8)
ALBUMIN/GLOB SERPL: 1 {RATIO} (ref 1–2)
ALP LIVER SERPL-CCNC: 133 U/L
ALT SERPL-CCNC: 13 U/L
ANION GAP SERPL CALC-SCNC: 10 MMOL/L (ref 0–18)
AST SERPL-CCNC: 21 U/L (ref ?–34)
BASOPHILS # BLD AUTO: 0.05 X10(3) UL (ref 0–0.2)
BASOPHILS NFR BLD AUTO: 0.6 %
BILIRUB SERPL-MCNC: 1.2 MG/DL (ref 0.2–1.1)
BUN BLD-MCNC: 24 MG/DL (ref 9–23)
CALCIUM BLD-MCNC: 9.3 MG/DL (ref 8.7–10.6)
CHLORIDE SERPL-SCNC: 102 MMOL/L (ref 98–112)
CO2 SERPL-SCNC: 24 MMOL/L (ref 21–32)
CREAT BLD-MCNC: 1.33 MG/DL
EGFRCR SERPLBLD CKD-EPI 2021: 38 ML/MIN/1.73M2 (ref 60–?)
EOSINOPHIL # BLD AUTO: 0.04 X10(3) UL (ref 0–0.7)
EOSINOPHIL NFR BLD AUTO: 0.5 %
ERYTHROCYTE [DISTWIDTH] IN BLOOD BY AUTOMATED COUNT: 14.3 %
FLUAV + FLUBV RNA SPEC NAA+PROBE: NEGATIVE
FLUAV + FLUBV RNA SPEC NAA+PROBE: NEGATIVE
GLOBULIN PLAS-MCNC: 4.3 G/DL (ref 2–3.5)
GLUCOSE BLD-MCNC: 264 MG/DL (ref 70–99)
HCT VFR BLD AUTO: 45.9 %
HGB BLD-MCNC: 14.8 G/DL
IMM GRANULOCYTES # BLD AUTO: 0.03 X10(3) UL (ref 0–1)
IMM GRANULOCYTES NFR BLD: 0.4 %
LACTATE SERPL-SCNC: 1.8 MMOL/L (ref 0.5–2)
LYMPHOCYTES # BLD AUTO: 0.96 X10(3) UL (ref 1–4)
LYMPHOCYTES NFR BLD AUTO: 12.1 %
MCH RBC QN AUTO: 29.5 PG (ref 26–34)
MCHC RBC AUTO-ENTMCNC: 32.2 G/DL (ref 31–37)
MCV RBC AUTO: 91.4 FL
MONOCYTES # BLD AUTO: 1.01 X10(3) UL (ref 0.1–1)
MONOCYTES NFR BLD AUTO: 12.7 %
NEUTROPHILS # BLD AUTO: 5.86 X10 (3) UL (ref 1.5–7.7)
NEUTROPHILS # BLD AUTO: 5.86 X10(3) UL (ref 1.5–7.7)
NEUTROPHILS NFR BLD AUTO: 73.7 %
OSMOLALITY SERPL CALC.SUM OF ELEC: 295 MOSM/KG (ref 275–295)
PLATELET # BLD AUTO: 139 10(3)UL (ref 150–450)
POTASSIUM SERPL-SCNC: 3.8 MMOL/L (ref 3.5–5.1)
PROT SERPL-MCNC: 8.6 G/DL (ref 5.7–8.2)
RBC # BLD AUTO: 5.02 X10(6)UL
RSV RNA SPEC NAA+PROBE: NEGATIVE
SARS-COV-2 RNA RESP QL NAA+PROBE: DETECTED
SODIUM SERPL-SCNC: 136 MMOL/L (ref 136–145)
WBC # BLD AUTO: 8 X10(3) UL (ref 4–11)

## 2025-03-11 PROCEDURE — 70450 CT HEAD/BRAIN W/O DYE: CPT | Performed by: EMERGENCY MEDICINE

## 2025-03-11 PROCEDURE — 71045 X-RAY EXAM CHEST 1 VIEW: CPT | Performed by: EMERGENCY MEDICINE

## 2025-03-11 RX ORDER — SODIUM CHLORIDE 9 MG/ML
INJECTION, SOLUTION INTRAVENOUS CONTINUOUS
Status: DISCONTINUED | OUTPATIENT
Start: 2025-03-11 | End: 2025-03-12

## 2025-03-12 PROBLEM — R53.1 WEAKNESS GENERALIZED: Status: ACTIVE | Noted: 2025-03-12

## 2025-03-12 PROBLEM — R40.0 SOMNOLENCE: Status: ACTIVE | Noted: 2025-03-12

## 2025-03-12 PROBLEM — N30.00 ACUTE CYSTITIS WITHOUT HEMATURIA: Status: ACTIVE | Noted: 2025-03-12

## 2025-03-12 PROBLEM — E86.0 DEHYDRATION: Status: ACTIVE | Noted: 2025-03-12

## 2025-03-12 LAB
ATRIAL RATE: 213 BPM
BILIRUB UR QL STRIP.AUTO: NEGATIVE
COLOR UR AUTO: YELLOW
EST. AVERAGE GLUCOSE BLD GHB EST-MCNC: 169 MG/DL (ref 68–126)
GLUCOSE BLD-MCNC: 102 MG/DL (ref 70–99)
GLUCOSE BLD-MCNC: 133 MG/DL (ref 70–99)
GLUCOSE BLD-MCNC: 163 MG/DL (ref 70–99)
GLUCOSE BLD-MCNC: 173 MG/DL (ref 70–99)
GLUCOSE UR STRIP.AUTO-MCNC: NORMAL MG/DL
HBA1C MFR BLD: 7.5 % (ref ?–5.7)
KETONES UR STRIP.AUTO-MCNC: NEGATIVE MG/DL
LEUKOCYTE ESTERASE UR QL STRIP.AUTO: 250
P AXIS: 123 DEGREES
PH UR STRIP.AUTO: 6 [PH] (ref 5–8)
PROCALCITONIN SERPL-MCNC: 0.13 NG/ML (ref ?–0.05)
PROT UR STRIP.AUTO-MCNC: 50 MG/DL
Q-T INTERVAL: 190 MS
QRS DURATION: 84 MS
QTC CALCULATION (BEZET): 206 MS
R AXIS: -36 DEGREES
SP GR UR STRIP.AUTO: 1.02 (ref 1–1.03)
T AXIS: 270 DEGREES
UROBILINOGEN UR STRIP.AUTO-MCNC: NORMAL MG/DL
VENTRICULAR RATE: 71 BPM
WBC #/AREA URNS AUTO: >50 /HPF

## 2025-03-12 PROCEDURE — 99223 1ST HOSP IP/OBS HIGH 75: CPT | Performed by: HOSPITALIST

## 2025-03-12 RX ORDER — WARFARIN SODIUM 2 MG/1
2 TABLET ORAL
Status: DISCONTINUED | OUTPATIENT
Start: 2025-03-12 | End: 2025-03-13

## 2025-03-12 RX ORDER — SENNOSIDES 8.6 MG
17.2 TABLET ORAL NIGHTLY PRN
Status: DISCONTINUED | OUTPATIENT
Start: 2025-03-12 | End: 2025-03-13

## 2025-03-12 RX ORDER — NICOTINE POLACRILEX 4 MG
30 LOZENGE BUCCAL
Status: DISCONTINUED | OUTPATIENT
Start: 2025-03-12 | End: 2025-03-13

## 2025-03-12 RX ORDER — METOCLOPRAMIDE HYDROCHLORIDE 5 MG/ML
10 INJECTION INTRAMUSCULAR; INTRAVENOUS EVERY 8 HOURS PRN
Status: DISCONTINUED | OUTPATIENT
Start: 2025-03-12 | End: 2025-03-12

## 2025-03-12 RX ORDER — SODIUM PHOSPHATE, DIBASIC AND SODIUM PHOSPHATE, MONOBASIC 7; 19 G/230ML; G/230ML
1 ENEMA RECTAL ONCE AS NEEDED
Status: DISCONTINUED | OUTPATIENT
Start: 2025-03-12 | End: 2025-03-13

## 2025-03-12 RX ORDER — METHIMAZOLE 5 MG/1
5 TABLET ORAL DAILY
Status: DISCONTINUED | OUTPATIENT
Start: 2025-03-12 | End: 2025-03-13

## 2025-03-12 RX ORDER — BISACODYL 10 MG
10 SUPPOSITORY, RECTAL RECTAL
Status: DISCONTINUED | OUTPATIENT
Start: 2025-03-12 | End: 2025-03-13

## 2025-03-12 RX ORDER — WARFARIN SODIUM 2 MG/1
2 TABLET ORAL DAILY
Status: DISCONTINUED | OUTPATIENT
Start: 2025-03-12 | End: 2025-03-12

## 2025-03-12 RX ORDER — METOCLOPRAMIDE HYDROCHLORIDE 5 MG/ML
5 INJECTION INTRAMUSCULAR; INTRAVENOUS EVERY 8 HOURS PRN
Status: DISCONTINUED | OUTPATIENT
Start: 2025-03-12 | End: 2025-03-13

## 2025-03-12 RX ORDER — LOSARTAN POTASSIUM 100 MG/1
100 TABLET ORAL 2 TIMES DAILY
Status: DISCONTINUED | OUTPATIENT
Start: 2025-03-12 | End: 2025-03-13

## 2025-03-12 RX ORDER — TRAMADOL HYDROCHLORIDE 50 MG/1
50 TABLET ORAL EVERY 12 HOURS PRN
Status: DISCONTINUED | OUTPATIENT
Start: 2025-03-12 | End: 2025-03-13

## 2025-03-12 RX ORDER — ATENOLOL 50 MG/1
50 TABLET ORAL
Status: DISCONTINUED | OUTPATIENT
Start: 2025-03-12 | End: 2025-03-13

## 2025-03-12 RX ORDER — WARFARIN SODIUM 5 MG/1
5 TABLET ORAL
Status: DISCONTINUED | OUTPATIENT
Start: 2025-03-14 | End: 2025-03-13

## 2025-03-12 RX ORDER — BENZONATATE 200 MG/1
200 CAPSULE ORAL 3 TIMES DAILY PRN
Status: DISCONTINUED | OUTPATIENT
Start: 2025-03-12 | End: 2025-03-13

## 2025-03-12 RX ORDER — DEXTROSE MONOHYDRATE 25 G/50ML
50 INJECTION, SOLUTION INTRAVENOUS
Status: DISCONTINUED | OUTPATIENT
Start: 2025-03-12 | End: 2025-03-13

## 2025-03-12 RX ORDER — ONDANSETRON 2 MG/ML
4 INJECTION INTRAMUSCULAR; INTRAVENOUS EVERY 6 HOURS PRN
Status: DISCONTINUED | OUTPATIENT
Start: 2025-03-12 | End: 2025-03-13

## 2025-03-12 RX ORDER — WARFARIN SODIUM 5 MG/1
5 TABLET ORAL DAILY
Status: DISCONTINUED | OUTPATIENT
Start: 2025-03-12 | End: 2025-03-12

## 2025-03-12 RX ORDER — POLYETHYLENE GLYCOL 3350 17 G/17G
17 POWDER, FOR SOLUTION ORAL DAILY PRN
Status: DISCONTINUED | OUTPATIENT
Start: 2025-03-12 | End: 2025-03-13

## 2025-03-12 RX ORDER — NICOTINE POLACRILEX 4 MG
15 LOZENGE BUCCAL
Status: DISCONTINUED | OUTPATIENT
Start: 2025-03-12 | End: 2025-03-13

## 2025-03-12 RX ORDER — ACETAMINOPHEN 500 MG
500 TABLET ORAL EVERY 4 HOURS PRN
Status: DISCONTINUED | OUTPATIENT
Start: 2025-03-12 | End: 2025-03-13

## 2025-03-12 NOTE — H&P
[facility]     Matt Cornejo Patient Status:  Emergency    1935 MRN [id number]   Location [facility] EMERGENCY DEPARTMENT Attending Leon Medrano MD   Hosp Day # 0 PCP Isaias Boo DO     Chief Complaint: weakness    Subjective:    History of Present Illness:     Matt Cornejo is a 89 year old female with history of atrial fibrillation, hypertension, type 2 diabetes presents the emergency room with generalized weakness this been going on for the last week.  No fevers, shortness of breath or cough or chest pain.  Patient's had little to eat or drink over the last couple days due to appetite.  No fevers, chills, nausea, vomiting, diarrhea or constipation.    History/Other:    Past Medical History:  Past Medical History:    Atrial fibrillation (HCC)    Diabetes (HCC)    Essential hypertension    Gout    Kidney stone    Osteoarthritis    Thyrotoxicosis     Past Surgical History:   Past Surgical History:   Procedure Laterality Date    Appendectomy      Cholecystectomy      Removal gallbladder      Total knee replacement        Family History:   No family history on file.  Social History:    reports that she has quit smoking. She has never used smokeless tobacco. She reports that she does not currently use alcohol. She reports that she does not use drugs.     Allergies: Allergies[1]    Medications:  Medications Ordered Prior to Encounter[2]    Review of Systems:   A comprehensive review of systems was completed.    Pertinent positives and negatives noted in the HPI.    Objective:   Physical Exam:    /56   Pulse 68   Temp 98.5 °F (36.9 °C) (Oral)   Resp 12   SpO2 92%   General: No acute distress, Alert  Respiratory: No rhonchi, no wheezes  Cardiovascular: S1, S2. Regular rate and rhythm  Abdomen: Soft, Non-tender, non-distended, positive bowel sounds  Neuro: No new focal deficits  Extremities: No edema      Results:    Labs:      Labs Last 24 Hours:    Recent  Labs   Lab 03/11/25 2020   RBC 5.02   HGB 14.8   HCT 45.9   MCV 91.4   MCH 29.5   MCHC 32.2   RDW 14.3   NEPRELIM 5.86   WBC 8.0   .0*       Recent Labs   Lab 03/11/25 2020   *   BUN 24*   CREATSERUM 1.33*   EGFRCR 38*   CA 9.3   ALB 4.3      K 3.8      CO2 24.0   ALKPHO 133   AST 21   ALT 13   BILT 1.2*   TP 8.6*       Estimated Glomerular Filtration Rate: 38 mL/min/1.73m2 (A) (result from lab).    Lab Results   Component Value Date    INR 2.27 (H) 12/25/2023    INR 2.40 (H) 12/23/2023    INR 3.15 (H) 11/12/2023       No results for input(s): \"TROP\", \"TROPHS\", \"CK\" in the last 168 hours.    No results for input(s): \"TROP\", \"PBNP\" in the last 168 hours.    No results for input(s): \"PCT\" in the last 168 hours.    Imaging: Imaging data reviewed in Epic.    Assessment & Plan:      # COVID  - No hypoxia so will hold off on steroids or remdesivir.  -As needed inhalers as needed.    # Hypertension  -Will continue on losartan and atenolol.    # Type 2 diabetes  -Will place on hyperglycemia protocol with correction factor insulin.    # Hypothyroidism  - will continue on methimazole        Plan of care discussed with patient at bedside    Abhay Schafer,     Supplementary Documentation:     The 21st Century Cures Act makes medical notes like these available to patients in the interest of transparency. Please be advised this is a medical document. Medical documents are intended to carry relevant information, facts as evident, and the clinical opinion of the practitioner. The medical note is intended as peer to peer communication and may appear blunt or direct. It is written in medical language and may contain abbreviations or verbiage that are unfamiliar.                                     [1]   Allergies  Allergen Reactions    Diltiazem HIVES   [2]   No current facility-administered medications on file prior to encounter.     Current Outpatient Medications on File Prior to Encounter    Medication Sig Dispense Refill    warfarin 2 MG Oral Tab Take 1 tablet (2 mg total) by mouth daily. Daily except friday      warfarin 5 MG Oral Tab Take 1 tablet (5 mg total) by mouth daily.      amoxicillin clavulanate 875-125 MG Oral Tab Take 1 tablet (875 mg total) by mouth every 12 (twelve) hours. 12 tablet 0    sodium chloride 0.65 % Nasal Solution 2 sprays by Nasal route in the morning, at noon, and at bedtime.      traMADol 50 MG Oral Tab Take 1 tablet (50 mg total) by mouth every 6 (six) hours as needed. 20 tablet 0    Multiple Vitamins-Minerals (EYE MULTIVITAMIN) Oral Cap Take 1 tablet by mouth in the morning and 1 tablet before bedtime.      glimepiride 2 MG Oral Tab Take 1 tablet (2 mg total) by mouth daily with breakfast.      losartan 100 MG Oral Tab Take 1 tablet (100 mg total) by mouth 2 (two) times daily.      acetaminophen 325 MG Oral Tab Take 2 tablets (650 mg total) by mouth every 6 (six) hours as needed for Pain.      atenolol 50 MG Oral Tab Take 1 tablet (50 mg total) by mouth in the morning and 1 tablet (50 mg total) before bedtime.      SITagliptin Phosphate 100 MG Oral Tab Take 1 tablet (100 mg total) by mouth daily. (Patient not taking: Reported on 12/23/2023)      methimazole 5 MG Oral Tab Take 1 tablet (5 mg total) by mouth daily.

## 2025-03-12 NOTE — PROGRESS NOTES
NURSING ADMISSION NOTE      Patient admitted via Cart  Oriented to room.  Safety precautions initiated.  Bed in low position.  Call light in reach.    Received pt from ED. Pt lethargic, but AO x4, following commands. VSS on RA. No tele orders. Admission completed. Pt Stebbins, HA at sunrise. JESSICA mera. Live. No c/o pain

## 2025-03-12 NOTE — PHYSICAL THERAPY NOTE
PHYSICAL THERAPY EVALUATION - INPATIENT     Room Number: 514/514-A  Evaluation Date: 3/12/2025  Type of Evaluation: Initial  Physician Order: PT Eval and Treat    Presenting Problem: +COVID19  Co-Morbidities : Afib, HTN, DM2  Reason for Therapy: Mobility Dysfunction and Discharge Planning    PHYSICAL THERAPY ASSESSMENT   Patient is a 89 year old female admitted 3/11/2025 for COVID19.   Patient is currently functioning near baseline with bed mobility, transfers, and gait. Prior to admission, patient's baseline is ambulatory with RW.     Patient will benefit from continued skilled PT Services with no additional skilled services but increased support at home.    PLAN  Patient has been evaluated and presents with no skilled Physical Therapy needs at this time.  Patient discharged from Physical Therapy services.  Please re-order if a new functional limitation presents during this admission.         GOALS  Patient was able to achieve the following goals ...    Patient was able to transfer At previous, functional level   Patient able to ambulate on level surfaces At previous, functional level     HOME SITUATION  Type of Home: Assisted living facility ([facility])  Home Layout: One level                     Lives With: Alone, Staff 24 hours    Drives: No         Prior Level of [place]:  Pt reports mod ind with RW.  Pt states staff assists with meds.  Pt ambulates to/from dining room.    SUBJECTIVE  \"I'm waiting for my fruit!\"    OBJECTIVE  Precautions: Bed/chair alarm  Fall Risk: High fall risk    WEIGHT BEARING RESTRICTION     PAIN ASSESSMENT  Ratin          COGNITION  Following Commands:  follows all commands and directions without difficulty    RANGE OF MOTION AND STRENGTH ASSESSMENT  Upper extremity ROM and strength are within functional limits     Lower extremity ROM is within functional limits     Lower extremity strength is within functional limits     BALANCE  Static Sitting: Good  Dynamic Sitting:  Good  Static Standing: Good  Dynamic Standing: Fair -    ADDITIONAL TESTS                                    ACTIVITY TOLERANCE                         O2 WALK       NEUROLOGICAL FINDINGS                        AM-PAC '6-Clicks' INPATIENT SHORT FORM - BASIC MOBILITY  How much difficulty does the patient currently have...  Patient Difficulty: Turning over in bed (including adjusting bedclothes, sheets and blankets)?: None   Patient Difficulty: Sitting down on and standing up from a chair with arms (e.g., wheelchair, bedside commode, etc.): A Little   Patient Difficulty: Moving from lying on back to sitting on the side of the bed?: A Little   How much help from another person does the patient currently need...   Help from Another: Moving to and from a bed to a chair (including a wheelchair)?: A Little   Help from Another: Need to walk in hospital room?: A Little   Help from Another: Climbing 3-5 steps with a railing?: A Little       AM-PAC Score:  Raw Score: 19   Approx Degree of Impairment: 41.77%   Standardized Score (AM-PAC Scale): 45.44   CMS Modifier (G-Code): CK    FUNCTIONAL ABILITY STATUS  Gait Assessment   Functional Mobility/Gait Assessment  Gait Assistance: Supervision  Distance (ft): 50  Assistive Device: Rolling walker  Pattern: Shuffle    Skilled Therapy Provided     Bed Mobility:  Rolling: ind  Supine to sit: ind   Sit to supine: NT     Transfer Mobility:  Sit to stand: supervision   Stand to sit: supervision  Gait = supervision    Therapist's comments:Pt encouraged to be OOB to chair, to ambulate frequently.    Exercise/Education Provided:  Bed mobility  Body mechanics  Gait training  Transfer training    Patient End of Session: Up in chair, Needs met, Call light within reach, RN aware of session/findings, All patient questions and concerns addressed, Alarm set, Discussed recommendations with /    Patient Evaluation Complexity Level:  History Moderate - 1 or 2 personal factors  and/or co-morbidities   Examination of body systems Moderate - addressing a total of 3 or more elements   Clinical Presentation  Moderate - Evolving   Clinical Decision Making Moderate Complexity       PT Session Time: 20 minutes    Therapeutic Activity: 8 minutes

## 2025-03-12 NOTE — CONSULTS
[facility]   part of [facility] ID CONSULT NOTE    Matt Cornejo Patient Status:  Observation    1935 MRN [id number]   Location [facility] 5NW-A Attending Abhay Schafer*   Hosp Day # 0 PCP Isaias Boo DO       Reason for Consultation:  Covid- 19    History of Present Illness:  Matt Cornejo is a 89 year old female with a history of HTN, DM and A. Fib. Patient presents with weakness/feeling ill x 1 week. She reports a cough and nasal congestion but states this is typical for her not different than baseline.     Denies any SOB, abdominal pain, nausea, vomiting or diarrhea. Denies any dysuria. Denies any flank pain.     Denies any known covid exposures but states she does live at a facility.     Afebrile.     History:  Past Medical History:    Atrial fibrillation (HCC)    Diabetes (HCC)    Essential hypertension    Gout    Kidney stone    Osteoarthritis    Thyrotoxicosis     Past Surgical History:   Procedure Laterality Date    Appendectomy      Cholecystectomy      Removal gallbladder      Total knee replacement       History reviewed. No pertinent family history.   reports that she has quit smoking. She has never used smokeless tobacco. She reports that she does not currently use alcohol. She reports that she does not use drugs.    Allergies:  Allergies[1]    Medications:    Current Facility-Administered Medications:     atenolol (Tenormin) tab 50 mg, 50 mg, Oral, 2x Daily(Beta Blocker)    losartan (Cozaar) tab 100 mg, 100 mg, Oral, BID    methIMAzole (Tapazole) tab 5 mg, 5 mg, Oral, Daily    traMADol (Ultram) tab 50 mg, 50 mg, Oral, Q12H PRN    glucose (Dex4) 15 GM/59ML oral liquid 15 g, 15 g, Oral, Q15 Min PRN **OR** glucose (Glutose) 40% oral gel 15 g, 15 g, Oral, Q15 Min PRN **OR** glucose-vitamin C (Dex-4) chewable tab 4 tablet, 4 tablet, Oral, Q15 Min PRN **OR** dextrose 50% injection 50 mL, 50 mL, Intravenous, Q15 Min PRN **OR** glucose (Dex4) 15 GM/59ML oral  liquid 30 g, 30 g, Oral, Q15 Min PRN **OR** glucose (Glutose) 40% oral gel 30 g, 30 g, Oral, Q15 Min PRN **OR** glucose-vitamin C (Dex-4) chewable tab 8 tablet, 8 tablet, Oral, Q15 Min PRN    melatonin tab 3 mg, 3 mg, Oral, Nightly PRN    ondansetron (Zofran) 4 MG/2ML injection 4 mg, 4 mg, Intravenous, Q6H PRN    acetaminophen (Tylenol Extra Strength) tab 500 mg, 500 mg, Oral, Q4H PRN    polyethylene glycol (PEG 3350) (Miralax) 17 g oral packet 17 g, 17 g, Oral, Daily PRN    sennosides (Senokot) tab 17.2 mg, 17.2 mg, Oral, Nightly PRN    bisacodyl (Dulcolax) 10 MG rectal suppository 10 mg, 10 mg, Rectal, Daily PRN    fleet enema (Fleet) rectal enema 133 mL, 1 enema, Rectal, Once PRN    insulin aspart (NovoLOG) 100 Units/mL FlexPen 1-10 Units, 1-10 Units, Subcutaneous, TID AC and HS    benzonatate (Tessalon) cap 200 mg, 200 mg, Oral, TID PRN    guaiFENesin (Robitussin) 100 MG/5 ML oral liquid 200 mg, 200 mg, Oral, Q4H PRN    metoclopramide (Reglan) 5 mg/mL injection 5 mg, 5 mg, Intravenous, Q8H PRN    warfarin (Coumadin) tab 2 mg, 2 mg, Oral, Once per day on Sunday Monday Tuesday Wednesday Thursday Saturday    [START ON 3/14/2025] warfarin (Coumadin) tab 5 mg, 5 mg, Oral, Every Friday    Review of Systems:  CONSTITUTIONAL: + weakness, + fatigue.  HEENT:  Eyes:  No visual loss. Ears, Nose, Throat:  No hearing loss  SKIN:  No rash or itching.  CARDIOVASCULAR:  No chest pain  RESPIRATORY:  No shortness of breath, + cough, no sputum.  GASTROINTESTINAL:  No nausea, vomiting or diarrhea. No abdominal pain  GENITOURINARY:  No Burning on urination.   NEUROLOGICAL:  No headache  MUSCULOSKELETAL:  No back pain  HEMATOLOGIC:  No bleeding.  ALLERGIES:  No history of asthma    Physical Exam:  Vital signs: Blood pressure 146/82, pulse 69, temperature 98 °F (36.7 °C), temperature source Oral, resp. rate 18, height 172.7 cm (5' 8\"), weight 159 lb (72.1 kg), SpO2 97%.    General: Alert, oriented, NAD, on room air  HEENT: Moist mucous  membranes. [place]  Neck: No lymphadenopathy.  Supple.  Cardiovascular: RRR  Respiratory: Clear to auscultation bilaterally.  No wheezes. No rhonchi.  Abdomen: Soft, nontender, nondistended.   Musculoskeletal: No edema noted  Integument: No lesions. No erythema.    Laboratory Data:  Recent Labs   Lab 03/11/25 2020   RBC 5.02   HGB 14.8   HCT 45.9   MCV 91.4   MCH 29.5   MCHC 32.2   RDW 14.3   NEPRELIM 5.86   WBC 8.0   .0*     Recent Labs   Lab 03/11/25 2020   *   BUN 24*   CREATSERUM 1.33*   CA 9.3   ALB 4.3      K 3.8      CO2 24.0   ALKPHO 133   AST 21   ALT 13   BILT 1.2*   TP 8.6*       Microbiology: Reviewed in EMR    Radiology: Reviewed.    PROCEDURE:  XR CHEST AP PORTABLE  (CPT=71045)     TECHNIQUE:  AP chest radiograph was obtained.     COMPARISON:  [facility] , XR, XR CHEST AP PORTABLE  (CPT=71045), 12/02/2021, 8:52 AM.     INDICATIONS:  ams, lethargy     PATIENT STATED HISTORY: (As transcribed by Technologist)  BIBA from [facility] c/o weakness, AMS, and lethargy x 1 week.      FINDINGS:  Heart size is within normal limits.  Pleural spaces appear clear.  Mediastinal and hilar contours are normal.  Lungs appear overall improved when compared with the prior exam.  Left basilar/retrocardiac opacity may be present, suspicious for  pneumonia.  Follow-up is recommended to ensure resolution.     Impression:    CONCLUSION:  See above.     LOCATION:  [facility]     Dictated by (CST): Anand Byrd MD on 3/11/2025 at 10:30 PM      Finalized by (CST): Anand Byrd MD on 3/11/2025 at 10:33 PM         PROCEDURE:  CT BRAIN OR HEAD (13527)     COMPARISON:  [facility] , CT, CT BRAIN OR HEAD (19571), 5/04/2021, 0:28 AM.     INDICATIONS:  ams, lethargy     TECHNIQUE:  Noncontrast CT scanning is performed through the brain. Dose reduction techniques were used. Dose information is transmitted to the ACR (American College of Radiology) NRDR (National Radiology Data Registry) which includes the Dose Index  Registry.      PATIENT STATED HISTORY: (As transcribed by Technologist)  Altered mental status and lethargy.      FINDINGS:    VENTRICLES/SULCI:  Ventricles and sulci are normal in size.    INTRACRANIAL:  There is no significant midline shift or mass effect present.  No evidence of acute intracranial hemorrhage.  There is an overall stable appearance of probable mild to moderate chronic microvascular ischemic changes with chronic appearing  infarct of the medial left frontal region.  SINUSES:           Mild mucosal thickening noted of the paranasal sinuses.  MASTOIDS:          No sign of acute inflammation.  SKULL:             No evidence for fracture or osseous abnormality.  OTHER:             None.     Impression:    CONCLUSION:  No significant midline shift or mass effect.  No acute intracranial hemorrhage.  Overall stable appearance of probable mild chronic microvascular ischemic changes.  If there is persistent clinical concern then consider MRI.     LOCATION:  [facility]     Dictated by (CST): Anand Byrd MD on 3/11/2025 at 10:05 PM      Finalized by (CST): Anand Byrd MD on 3/11/2025 at 10:07 PM     ASSESSMENT:  Covid- 19 Infection  Last recorded covid vaccine 2/2021.   Symptoms started 1 week ago; no hypoxia. CXR not c/w covid pna.  Abnormal UA, possible UTI. However, patient without urinary symptoms  DM II, hgb A1C 7.5  HTN, A. fib    PLAN:  - would hold off on covid treatment for now and monitor  - continue IV CTX for now for possible UTI  - follow urine culture  - follow respiratory status  - follow temps and wbc  - reviewed labs, micro, imaging reports, available old records    Discussed case with RN, patient and Dr. Irby    Thank you for allowing us to participate in the care of this patient. Please do not hesitate to call if you have any questions.   We will continue to follow with you and will make further recommendations based on her progress.    [facility]  [phone]  3/12/2025    ID ATTENDING ADDENDUM     Pt seen an examined independently. Chart reviewed. Agree with above. Note has been reviewed by me and modified as needed.  Exam and Impression/ Recs as noted above.  Will follow  D/w staff and with pt  More than 50% of clinical time and 100% of the clinical decision making performed by me.    Chloe Irby MD         [1]   Allergies  Allergen Reactions    Diltiazem HIVES    Ace Inhibitors UNKNOWN

## 2025-03-12 NOTE — OCCUPATIONAL THERAPY NOTE
OCCUPATIONAL THERAPY EVALUATION - INPATIENT    Room Number: 514/514-A  Evaluation Date: 3/12/2025     Type of Evaluation: Initial  Presenting Problem: COVID19    Physician Order: IP Consult to Occupational Therapy  Reason for Therapy:  ADL/IADL Dysfunction and Discharge Planning    OCCUPATIONAL THERAPY ASSESSMENT   Patient is a 89 year old female admitted on 3/11/2025 with Presenting Problem: COVID19. Co-Morbidities : Afib, HTN, DM2  Patient is currently functioning near baseline with toileting, lower body dressing, transfers, static sitting balance, dynamic sitting balance, static standing balance, dynamic standing balance, maintaining seated position, functional standing tolerance, energy conservation strategies, and aerobic capacity.  Prior to admission, patient's baseline is modIND with ADLs.  Patient met all OT goals at supervision-SBA level.  Patient reports no further questions/concerns at this time.     Patient will benefit from continued skilled OT Services with no additional skilled services but increased support at home    Recommendations for nursing staff:   Transfers: up x 1 assist, supervision-SBA, RW  Toileting location: Toilet    EVALUATION SESSION:  Patient at start of session: seated upright in chair    FUNCTIONAL TRANSFER ASSESSMENT  Sit to Stand: Chair  Chair: Stand-by Assist  Toilet Transfer: Stand-by Assist    BED MOBILITY     BALANCE ASSESSMENT  Static Sitting: Supervision  Static Standing: Stand-by Assist    FUNCTIONAL ADL ASSESSMENT  Grooming Standing: Supervision (hand hygiene standing at sink)  LB Dressing Seated: Supervision (donned/doffed brief)  Toileting Seated: Supervision    ACTIVITY TOLERANCE: WFL; pt tolerated activity from chair > toilet > chair with no loss of balance or SOB noted.                         O2 SATURATIONS       COGNITION  Overall Cognitive Status:  WFL - within functional limits    COGNITION ASSESSMENTS     Upper Extremity:   ROM: within functional  limits  Strength: is within functional limits      EDUCATION PROVIDED  Patient Education : Role of Occupational Therapy; Plan of Care; Functional Transfer Techniques; Fall Prevention; Posture/Positioning; Proper Body Mechanics; Energy Conservation  Patient's Response to Education: Verbalized Understanding; Returned Demonstration    Equipment used: RW  Demonstrates functional use    Therapist comments: RN cleared pt for session, received sitting upright in chair. Pt tolerated short walk to bathroom for toileting task, able to manage toileting/brief change with supervision-SBA. Pt tolerated ~3 min dynamic standing activity at sink for grooming/hand hygiene. Pt ambulated back to chair in room, legs elevated at end of session. Pt encouraged to continue with OOB mobility and function with nursing staff to prevent further deconditioning while in house; pt verbalized understanding and in agreement.     Patient End of Session: Up in chair, Needs met, Call light within reach, RN aware of session/findings, All patient questions and concerns addressed, Hospital anti-slip socks, Alarm set    OCCUPATIONAL PROFILE    HOME SITUATION  Type of Home: Assisted living facility ([facility])  Home Layout: One level  Lives With: Alone, Staff 24 hours          Other Equipment:  (RW, cane)          Drives: No       Prior Level of Function: Per pt report, manages ADLs modIND, typically uses a RW to walk down to dining [employer]aman for meals/activities, pt reports she occasionally uses a cane within her apartment.     SUBJECTIVE  \"Well I'm glad you like [place] too!\"    PAIN ASSESSMENT  Ratin          OBJECTIVE  Precautions: Bed/chair alarm  Fall Risk: High fall risk    WEIGHT BEARING RESTRICTION       AM-PAC ‘6-Clicks’ Inpatient Daily Activity Short Form  -   Putting on and taking off regular lower body clothing?: None  -   Bathing (including washing, rinsing, drying)?: None  -   Toileting, which includes using toilet, bedpan or urinal? : None  -    Putting on and taking off regular upper body clothing?: None  -   Taking care of personal grooming such as brushing teeth?: None  -   Eating meals?: None    AM-PAC Score:  Score: 24  Approx Degree of Impairment: 0%  Standardized Score (AM-PAC Scale): 57.54    ADDITIONAL TESTS     NEUROLOGICAL FINDINGS      PLAN   Patient has been evaluated and presents with no skilled Occupational Therapy needs at this time.  Patient discharged from Occupational Therapy services.  Please re-order if a new functional limitation presents during this admission.    OT Device Recommendations: None    Patient Evaluation Complexity Level:   Occupational Profile/Medical History LOW - Brief history including review of medical or therapy records    Specific performance deficits impacting engagement in ADL/IADL LOW  1 - 3 performance deficits    Client Assessment/Performance Deficits LOW - No comorbidities nor modifications of tasks    Clinical Decision Making LOW - Analysis of occupational profile, problem-focused assessments, limited treatment options    Overall Complexity LOW     OT Session Time: 20 minutes  Self-Care Home Management: 12 minutes

## 2025-03-12 NOTE — PROGRESS NOTES
Orders for admission, patient is aware of plan and ready to go upstairs. Any questions, please call ED RN Annel at extension [phone].     Patient Covid vaccination status: Fully vaccinated     COVID Test Ordered in ED: SARS-CoV-2/Flu A and B/RSV by PCR (GeneXpert)    COVID Suspicion at Admission: N/A    Running Infusions:    sodium chloride 125 mL/hr at 03/11/25 2241        Mental Status/LOC at time of transport: a/o x2-3    Other pertinent information:   CIWA score: N/A   NIH score:  N/A     VERY [place] DOES NOT HAVE HEARING AIDS WITH HER

## 2025-03-12 NOTE — ED INITIAL ASSESSMENT (HPI)
BIBA from [facility] c/o weakness, AMS, and lethargy x 1 week. Per EMS, staff states she is normally fully alert and cares for herself independently, upon arrival pt is A&O x2 but slow to respond. Pt denies chest pain, denies SOB.

## 2025-03-12 NOTE — PLAN OF CARE
3/12 a/ox4, very hard of hearing. Denies any sob, on RA, sat=97%. Afebrile. Continent of b/b. Seen by pt/ot. Able to ambulate with walker x 1 standby assist. On rocephine iv for uti. Seen by ID. NO remdesivir nor paxlovid at this time. Call light within reach.     Problem: Diabetes/Glucose Control  Goal: Glucose maintained within prescribed range  Description: INTERVENTIONS:- Monitor Blood Glucose as ordered- Assess for signs and symptoms of hyperglycemia and hypoglycemia- Administer ordered medications to maintain glucose within target range- Assess barriers to adequate nutritional intake and initiate nutrition consult as needed- Instruct patient on self management of diabetes  Outcome: Progressing     Problem: Patient/Family Goals  Goal: Patient/Family Long Term Goal  Description: Patient's Long Term Goal: discharge   Interventions:- follow poc  -monitor VS  - See additional Care Plan goals for specific interventions  Outcome: Progressing  Goal: Patient/Family Short Term Goal  Description: Patient's Short Term Goal:   3/11 NOC: get rest  Interventions: - - See additional Care Plan goals for specific interventions  Outcome: Progressing

## 2025-03-12 NOTE — ED PROVIDER NOTES
Patient Seen in: [facility] Emergency Department      History     Chief Complaint   Patient presents with    Altered Mental Status     Stated Complaint: ams, lethargy    Subjective:   HPI      89-year-old female has been experiencing generalized weakness and just not acting self and feeling very weak. She began feeling unwell last week on Thursday, March 6, 2025, and has been consistently feeling down since then. . She reports feeling extremely weak and fatigued, with no energy, but denies having a cough or shortness of breath. There is a concern about her hydration status, as it is unclear how much she has been eating or drinking. She does not have a history of congestive heart failure and is not on diuretics.  Patient states she just feels exhausted and has no energy    Objective:     Past Medical History:    Atrial fibrillation (HCC)    Diabetes (HCC)    Essential hypertension    Gout    Kidney stone    Osteoarthritis    Thyrotoxicosis              Past Surgical History:   Procedure Laterality Date    Appendectomy      Cholecystectomy      Removal gallbladder      Total knee replacement                  Social History     Socioeconomic History    Marital status:    Tobacco Use    Smoking status: Former    Smokeless tobacco: Never   Substance and Sexual Activity    Alcohol use: Not Currently    Drug use: Never     Social Drivers of Health     Food Insecurity: No Food Insecurity (11/12/2023)    Food Insecurity     Food Insecurity: Never true   Transportation Needs: No Transportation Needs (11/12/2023)    Transportation Needs     Lack of Transportation: No   Housing Stability: Low Risk  (11/12/2023)    Housing Stability     Housing Instability: No                  Physical Exam     ED Triage Vitals [03/11/25 2018]   BP (!) 146/94   Pulse 72   Resp 22   Temp 98.5 °F (36.9 °C)   Temp src Oral   SpO2 99 %   O2 Device None (Room air)       Current Vitals:   Vital Signs  BP: 127/56  Pulse: 68  Resp:  12  Temp: 98.5 °F (36.9 °C)  Temp src: Oral  MAP (mmHg): 75    Oxygen Therapy  SpO2: 92 %  O2 Device: None (Room air)        Physical Exam    Vital signs reviewed  General appearance: Patient is very fatigued  HEENT: Pupils equal react to light extraocular muscles intact no scleral icterus, mucous membranes are dry, there is no erythema or exudate in the posterior pharynx  Neck: Supple no JVD no lymphadenopathy no meningismus no carotid bruit  CV: Regular rate and rhythm no murmur rub  Respiratory: Coarse breath sounds bilaterally no crackles no wheezes   abdomen: Soft nontender nondistended, no rebound no guarding  no hepatosplenomegaly bowel sounds are present , no pulsatile mass  Extremities: No clubbing cyanosis or edema 2+ distal pulses.  Neuro: Cranial nerves II through XII intact with no gross focal sensory or motor abnormality.      ED Course     Labs Reviewed   COMP METABOLIC PANEL (14) - Abnormal; Notable for the following components:       Result Value    Glucose 264 (*)     BUN 24 (*)     Creatinine 1.33 (*)     eGFR-Cr 38 (*)     Bilirubin, Total 1.2 (*)     Total Protein 8.6 (*)     Globulin  4.3 (*)     All other components within normal limits   CBC WITH DIFFERENTIAL WITH PLATELET - Abnormal; Notable for the following components:    .0 (*)     Lymphocyte Absolute 0.96 (*)     Monocyte Absolute 1.01 (*)     All other components within normal limits   URINALYSIS WITH CULTURE REFLEX - Abnormal; Notable for the following components:    Clarity Urine Turbid (*)     Blood Urine Trace (*)     Protein Urine 50 (*)     Nitrite Urine 2+ (*)     Leukocyte Esterase Urine 250 (*)     WBC Urine >50 (*)     RBC Urine 6-10 (*)     Bacteria Urine 3+ (*)     Squamous Epi. Cells Few (*)     All other components within normal limits   PROCALCITONIN - Abnormal; Notable for the following components:    Procalcitonin 0.13 (*)     All other components within normal limits   SARS-COV-2/FLU A AND B/RSV BY PCR  (GENEXPERT) - Abnormal; Notable for the following components:    SARS-CoV-2 (COVID-19) - (GeneXpert) Detected (*)     All other components within normal limits    Narrative:     This test is intended for the qualitative detection and differentiation of SARS-CoV-2, influenza A, influenza B, and respiratory syncytial virus (RSV) viral RNA in nasopharyngeal or nares swabs from individuals suspected of respiratory viral infection consistent with COVID-19 by their healthcare provider. Signs and symptoms of respiratory viral infection due to SARS-CoV-2, influenza, and RSV can be similar.    Test performed using the Xpert Xpress SARS-CoV-2/FLU/RSV (real time RT-PCR)  assay on the GeneXpert instrument, [employer], [place] [address].   This test is being used under the Food and Drug Administration's Emergency Use Authorization.    The authorized Fact Sheet for Healthcare Providers for this assay is available upon request from the laboratory.   LACTIC ACID, PLASMA - Normal   RAINBOW DRAW LAVENDER   RAINBOW DRAW LIGHT GREEN   RAINBOW DRAW BLUE   URINE CULTURE, ROUTINE     EKG    Rate, intervals and axes as noted on EKG Report.  Rate: 71  Rhythm: Atrial Fibrillation  Reading: Atrial fibrillation                Was evaluated had a CBC chemistry COVID flu RSV lactic acid and urinalysis.  Patient did come back positive for COVID.  Daughter states she has never had COVID before.  She has not been running a fever.  Will get a CT scan of the head give her a fluid bolus of 500 cc and also check a chest x-ray.     XR CHEST AP PORTABLE  (CPT=71045)    Result Date: 3/11/2025  CONCLUSION:  See above.   LOCATION:  [facility]      Dictated by (CST): Anand Byrd MD on 3/11/2025 at 10:30 PM     Finalized by (CST): Anand Byrd MD on 3/11/2025 at 10:33 PM       CT BRAIN OR HEAD (CPT=70450)    Result Date: 3/11/2025  CONCLUSION:  No significant midline shift or mass effect.  No acute intracranial hemorrhage.  Overall stable appearance of probable mild  chronic microvascular ischemic changes.  If there is persistent clinical concern then consider MRI.     LOCATION:  [place]   Dictated by (CST): Anand Byrd MD on 3/11/2025 at 10:05 PM     Finalized by (CST): Anand Byrd MD on 3/11/2025 at 10:07 PM        Patient's checks x-ray does show a little infiltrate that could be pneumonia but could just be Covid.  Will do a procalcitonin as her lactic acid was on the higher end of normal but no white count.  Discussed case with the hospitalist who agrees with plan.  If her Pro-Felice is elevated we will start on some antibiotics I do feel patient needs to be admitted as she is quite frail and not able to really care her for self at this time.  Patient's urinalysis did show a significant urinary tract infection this definitely could be also causing her to feel fatigued and altered.  Will give a dose of IV antibiotics  Rocephin.  She is not septic at this time.  Patient will be admitted as per above.    MDM      Differential diagnosis reflecting the complexity of care include: Covid19, dehydration, pneumonia, ischemic stroke, urinary tract infection    Comorbidities that add complexity to management include: Atrial fibrillation, diabetes        Discussions of management was done with: Hospitalist who agrees with plan.  Patient will be admitted for IV hydration and further monitoring    My independent interpretation of studies of: CT brain showed no mass effect or ischemic event but chest x-ray did show a little infiltrate in the left base.      Shared decision making was done by myself and patient and her daughter.  Along with the hospitalist.  Patient will be admitted 1 dose IV antibiotics for her UTI and can switch over to oral if doing better.   Await urine cultures        Admission disposition: 3/12/2025 12:00 AM           Medical Decision Making      Disposition and Plan     Clinical Impression:  1. COVID-19    2. Somnolence    3. Weakness generalized    4. Dehydration    5.  Acute cystitis without hematuria         Disposition:  Admit  3/12/2025 12:00 am    Follow-up:  No follow-up provider specified.        Medications Prescribed:  Current Discharge Medication List              Supplementary Documentation: Patient was evaluated in the emergency department and at this point patient will need admission for further management of medical condition.  Patient was stable in the emergency department and will be transferred to floor for further definitive care.  Patient's questions were answered.    This note was prepared using Dragon Medical voice recognition dictation software. As a result errors may occur. When identified these errors have been corrected. While every attempt is made to correct errors during dictation discrepancies may still exist        Hospital Problems       Present on Admission  Date Reviewed: 7/7/2021            ICD-10-CM Noted POA    * (Principal) COVID-19 U07.1 3/11/2025 Unknown

## 2025-03-12 NOTE — ED QUICK NOTES
Assumed care of pt.  Plan of Care reviewed.     Waiting for urine test results.    Bed is locked and in lowest position.   Call light within reach.  Daughter at bedside.

## 2025-03-13 VITALS
BODY MASS INDEX: 24.1 KG/M2 | SYSTOLIC BLOOD PRESSURE: 138 MMHG | OXYGEN SATURATION: 98 % | HEART RATE: 75 BPM | TEMPERATURE: 98 F | HEIGHT: 68 IN | DIASTOLIC BLOOD PRESSURE: 80 MMHG | RESPIRATION RATE: 18 BRPM | WEIGHT: 159 LBS

## 2025-03-13 LAB
ANION GAP SERPL CALC-SCNC: 9 MMOL/L (ref 0–18)
BASOPHILS # BLD AUTO: 0.04 X10(3) UL (ref 0–0.2)
BASOPHILS NFR BLD AUTO: 0.8 %
BUN BLD-MCNC: 17 MG/DL (ref 9–23)
CALCIUM BLD-MCNC: 8.8 MG/DL (ref 8.7–10.6)
CHLORIDE SERPL-SCNC: 104 MMOL/L (ref 98–112)
CO2 SERPL-SCNC: 26 MMOL/L (ref 21–32)
CREAT BLD-MCNC: 1.03 MG/DL
EGFRCR SERPLBLD CKD-EPI 2021: 52 ML/MIN/1.73M2 (ref 60–?)
EOSINOPHIL # BLD AUTO: 0.09 X10(3) UL (ref 0–0.7)
EOSINOPHIL NFR BLD AUTO: 1.8 %
ERYTHROCYTE [DISTWIDTH] IN BLOOD BY AUTOMATED COUNT: 14.3 %
GLUCOSE BLD-MCNC: 114 MG/DL (ref 70–99)
GLUCOSE BLD-MCNC: 117 MG/DL (ref 70–99)
GLUCOSE BLD-MCNC: 140 MG/DL (ref 70–99)
HCT VFR BLD AUTO: 41.1 %
HGB BLD-MCNC: 13.7 G/DL
IMM GRANULOCYTES # BLD AUTO: 0.02 X10(3) UL (ref 0–1)
IMM GRANULOCYTES NFR BLD: 0.4 %
INR BLD: 1.96 (ref 0.8–1.2)
LYMPHOCYTES # BLD AUTO: 1.58 X10(3) UL (ref 1–4)
LYMPHOCYTES NFR BLD AUTO: 30.7 %
MCH RBC QN AUTO: 30 PG (ref 26–34)
MCHC RBC AUTO-ENTMCNC: 33.3 G/DL (ref 31–37)
MCV RBC AUTO: 89.9 FL
MONOCYTES # BLD AUTO: 0.69 X10(3) UL (ref 0.1–1)
MONOCYTES NFR BLD AUTO: 13.4 %
NEUTROPHILS # BLD AUTO: 2.72 X10 (3) UL (ref 1.5–7.7)
NEUTROPHILS # BLD AUTO: 2.72 X10(3) UL (ref 1.5–7.7)
NEUTROPHILS NFR BLD AUTO: 52.9 %
OSMOLALITY SERPL CALC.SUM OF ELEC: 290 MOSM/KG (ref 275–295)
PLATELET # BLD AUTO: 102 10(3)UL (ref 150–450)
POTASSIUM SERPL-SCNC: 3.3 MMOL/L (ref 3.5–5.1)
PROTHROMBIN TIME: 22.5 SECONDS (ref 11.6–14.8)
RBC # BLD AUTO: 4.57 X10(6)UL
SODIUM SERPL-SCNC: 139 MMOL/L (ref 136–145)
WBC # BLD AUTO: 5.1 X10(3) UL (ref 4–11)

## 2025-03-13 PROCEDURE — 99238 HOSP IP/OBS DSCHRG MGMT 30/<: CPT | Performed by: HOSPITALIST

## 2025-03-13 RX ORDER — POTASSIUM CHLORIDE 1500 MG/1
40 TABLET, EXTENDED RELEASE ORAL ONCE
Status: COMPLETED | OUTPATIENT
Start: 2025-03-13 | End: 2025-03-13

## 2025-03-13 NOTE — CM/SW NOTE
03/13/25 1100   Discharge disposition   Expected discharge disposition [facility]   Post Acute Care Provider Karen FRENCH No   Discharge transportation Private car     Informed by RN that patient is medically cleared for discharge and daughter plans to  at 5pm. Spoke with floor nurse Monster from [facility] who confirmed bed available today. Informed her of COVID+. RN to call reportSW will remain available.    [facility]  [phone]      BARBARA Navarrete  Discharge Planner

## 2025-03-13 NOTE — PLAN OF CARE
3/13 a/ox3, very hard of hearing. Denies any pain nor sob. On RA sat=98% . Ambulates with walker. Rocephine iv given early as ordered to facilitate discharge back to [facility]. Report given to alessio kebede at [facility]. Daughter eddie notified of discharge, daughter will pick her up between 5-6pm. Pt. Refused to eat dinner at this time.     Problem: Diabetes/Glucose Control  Goal: Glucose maintained within prescribed range  Description: INTERVENTIONS:- Monitor Blood Glucose as ordered- Assess for signs and symptoms of hyperglycemia and hypoglycemia- Administer ordered medications to maintain glucose within target range- Assess barriers to adequate nutritional intake and initiate nutrition consult as needed- Instruct patient on self management of diabetes  Outcome: Adequate for Discharge     Problem: Patient/Family Goals  Goal: Patient/Family Long Term Goal  Description: Patient's Long Term Goal: discharge   Interventions:- follow poc  -monitor VS  - See additional Care Plan goals for specific interventions  Outcome: Adequate for Discharge  Goal: Patient/Family Short Term Goal  Description: Patient's Short Term Goal:   3/11 NOC: get rest   3/12 NOC:Rest    Interventions: - - See additional Care Plan goals for specific interventions  -cluster care  Outcome: Adequate for Discharge

## 2025-03-13 NOTE — PROGRESS NOTES
[facility]   part of [facility] ID PROGRESS NOTE    Matt Cornejo Patient Status:  Observation    1935 MRN [id number]   Location [facility] 5NW-A Attending Abhay Schafer*   Hosp Day # 0 PCP Isaias Boo DO     Abx: IV CTX D#1    Subjective: Patient seen and examined today. No complaints. Denies any SOB, cough or dysuria. Afebrile. On room air.     Allergies:  Allergies[1]    Medications:    Current Facility-Administered Medications:     atenolol (Tenormin) tab 50 mg, 50 mg, Oral, 2x Daily(Beta Blocker)    losartan (Cozaar) tab 100 mg, 100 mg, Oral, BID    methIMAzole (Tapazole) tab 5 mg, 5 mg, Oral, Daily    traMADol (Ultram) tab 50 mg, 50 mg, Oral, Q12H PRN    glucose (Dex4) 15 GM/59ML oral liquid 15 g, 15 g, Oral, Q15 Min PRN **OR** glucose (Glutose) 40% oral gel 15 g, 15 g, Oral, Q15 Min PRN **OR** glucose-vitamin C (Dex-4) chewable tab 4 tablet, 4 tablet, Oral, Q15 Min PRN **OR** dextrose 50% injection 50 mL, 50 mL, Intravenous, Q15 Min PRN **OR** glucose (Dex4) 15 GM/59ML oral liquid 30 g, 30 g, Oral, Q15 Min PRN **OR** glucose (Glutose) 40% oral gel 30 g, 30 g, Oral, Q15 Min PRN **OR** glucose-vitamin C (Dex-4) chewable tab 8 tablet, 8 tablet, Oral, Q15 Min PRN    melatonin tab 3 mg, 3 mg, Oral, Nightly PRN    ondansetron (Zofran) 4 MG/2ML injection 4 mg, 4 mg, Intravenous, Q6H PRN    acetaminophen (Tylenol Extra Strength) tab 500 mg, 500 mg, Oral, Q4H PRN    polyethylene glycol (PEG 3350) (Miralax) 17 g oral packet 17 g, 17 g, Oral, Daily PRN    sennosides (Senokot) tab 17.2 mg, 17.2 mg, Oral, Nightly PRN    bisacodyl (Dulcolax) 10 MG rectal suppository 10 mg, 10 mg, Rectal, Daily PRN    fleet enema (Fleet) rectal enema 133 mL, 1 enema, Rectal, Once PRN    insulin aspart (NovoLOG) 100 Units/mL FlexPen 1-10 Units, 1-10 Units, Subcutaneous, TID AC and HS    benzonatate (Tessalon) cap 200 mg, 200 mg, Oral, TID PRN    guaiFENesin (Robitussin) 100 MG/5 ML oral liquid  200 mg, 200 mg, Oral, Q4H PRN    metoclopramide (Reglan) 5 mg/mL injection 5 mg, 5 mg, Intravenous, Q8H PRN    warfarin (Coumadin) tab 2 mg, 2 mg, Oral, Once per day on Sunday Monday Tuesday Wednesday Thursday Saturday    [START ON 3/14/2025] warfarin (Coumadin) tab 5 mg, 5 mg, Oral, Every Friday    cefTRIAXone (Rocephin) 1 g in sodium chloride 0.9% 100 mL IVPB-ADDV, 1 g, Intravenous, Q24H    Review of Systems:  Completed. See pertinent positives and negatives above.     Physical Exam:  Vital signs: Blood pressure 138/80, pulse 70, temperature 98.3 °F (36.8 °C), temperature source Oral, resp. rate 18, height 172.7 cm (5' 8\"), weight 159 lb (72.1 kg), SpO2 98%.    General: Alert, oriented, NAD, on room air  HEENT: Moist mucous membranes. [place]  Neck: No lymphadenopathy.  Supple.  Cardiovascular: RRR  Respiratory: Clear to auscultation bilaterally.  No wheezes. No rhonchi.  Abdomen: Soft, nontender, nondistended.   Musculoskeletal: No edema noted  Integument: No lesions. No erythema.    Laboratory Data:  Recent Labs   Lab 03/13/25  0601   RBC 4.57   HGB 13.7   HCT 41.1   MCV 89.9   MCH 30.0   MCHC 33.3   RDW 14.3   NEPRELIM 2.72   WBC 5.1   .0*     Recent Labs   Lab 03/11/25 2020 03/13/25  0602   * 114*   BUN 24* 17   CREATSERUM 1.33* 1.03*   CA 9.3 8.8   ALB 4.3  --     139   K 3.8 3.3*    104   CO2 24.0 26.0   ALKPHO 133  --    AST 21  --    ALT 13  --    BILT 1.2*  --    TP 8.6*  --        Microbiology: Reviewed in EMR    Radiology: Reviewed.    PROCEDURE:  XR CHEST AP PORTABLE  (CPT=71045)     TECHNIQUE:  AP chest radiograph was obtained.     COMPARISON:  MELVIN , SAMANTHA, XR CHEST AP PORTABLE  (CPT=71045), 12/02/2021, 8:52 AM.     INDICATIONS:  ams, lethargy     PATIENT STATED HISTORY: (As transcribed by Technologist)  BIBA from [facility] c/o weakness, AMS, and lethargy x 1 week.      FINDINGS:  Heart size is within normal limits.  Pleural spaces appear clear.  Mediastinal and hilar contours are  normal.  Lungs appear overall improved when compared with the prior exam.  Left basilar/retrocardiac opacity may be present, suspicious for  pneumonia.  Follow-up is recommended to ensure resolution.     Impression:    CONCLUSION:  See above.     LOCATION:  [facility]     Dictated by (CST): Anand Byrd MD on 3/11/2025 at 10:30 PM      Finalized by (CST): Anand Byrd MD on 3/11/2025 at 10:33 PM         PROCEDURE:  CT BRAIN OR HEAD (07511)     COMPARISON:  [facility] , CT, CT BRAIN OR HEAD (35414), 5/04/2021, 0:28 AM.     INDICATIONS:  ams, lethargy     TECHNIQUE:  Noncontrast CT scanning is performed through the brain. Dose reduction techniques were used. Dose information is transmitted to the ACR (American College of Radiology) NRDR (National Radiology Data Registry) which includes the Dose Index  Registry.     PATIENT STATED HISTORY: (As transcribed by Technologist)  Altered mental status and lethargy.      FINDINGS:    VENTRICLES/SULCI:  Ventricles and sulci are normal in size.    INTRACRANIAL:  There is no significant midline shift or mass effect present.  No evidence of acute intracranial hemorrhage.  There is an overall stable appearance of probable mild to moderate chronic microvascular ischemic changes with chronic appearing  infarct of the medial left frontal region.  SINUSES:           Mild mucosal thickening noted of the paranasal sinuses.  MASTOIDS:          No sign of acute inflammation.  SKULL:             No evidence for fracture or osseous abnormality.  OTHER:             None.     Impression:    CONCLUSION:  No significant midline shift or mass effect.  No acute intracranial hemorrhage.  Overall stable appearance of probable mild chronic microvascular ischemic changes.  If there is persistent clinical concern then consider MRI.     LOCATION:  [facility]     Dictated by (CST): Anand Byrd MD on 3/11/2025 at 10:05 PM      Finalized by (CST): Anand Byrd MD on 3/11/2025 at 10:07 PM     ASSESSMENT:  Covid- 19  Infection  Last recorded covid vaccine 2/2021.   Symptoms started 1 week ago; no hypoxia. CXR not c/w covid pna.  E. Coli UTI vs bacteriuria. Patient without urinary symptoms  DM II, hgb A1C 7.5  HTN, A. fib    PLAN:  - would hold off on covid treatment for now and monitor  - continue IV CTX for E. Coli in the urine, will plan to complete short course  - follow urine culture  - follow respiratory status  - follow temps and wbc  - will give 3rd dose of CTX prior to dc today. Will not need further abx on dc. Ok to dc from ID standpoint.    Discussed case with RN, patient and ALMA DELIA Vasquez   [facility]  [phone]    ID ATTENDING ADDENDUM     Pt seen an examined independently. Chart reviewed. Agree with above. Note has been reviewed by me and modified as needed.  Exam and Impression/ Recs as noted above.  More than 50% of clinical time and 100% of the clinical decision making performed by me.    Chloe Irby MD         [1]   Allergies  Allergen Reactions    Diltiazem HIVES    Ace Inhibitors UNKNOWN

## 2025-03-13 NOTE — PROGRESS NOTES
Daughter here, iv discontinued , dc instructions given, verbalized understanding. All belongings taken with her. Wheeled down to lobby.

## 2025-03-13 NOTE — PLAN OF CARE
A&OX3,forgetful. Maintaining O2 on RA. Tele, NSR. Denies pain. Up SBA with walker. PIV Rocephin 1g. Carb controlled diet. No further needs at this time. Continue POC. Safety precaution in place.   Problem: Diabetes/Glucose Control  Goal: Glucose maintained within prescribed range  Description: INTERVENTIONS:- Monitor Blood Glucose as ordered- Assess for signs and symptoms of hyperglycemia and hypoglycemia- Administer ordered medications to maintain glucose within target range- Assess barriers to adequate nutritional intake and initiate nutrition consult as needed- Instruct patient on self management of diabetes  Outcome: Progressing     Problem: Patient/Family Goals  Goal: Patient/Family Long Term Goal  Description: Patient's Long Term Goal: discharge   Interventions:- follow poc  -monitor VS  - See additional Care Plan goals for specific interventions  Outcome: Progressing  Goal: Patient/Family Short Term Goal  Description: Patient's Short Term Goal:   3/11 NOC: get rest   3/12 NOC:Rest    Interventions: - - See additional Care Plan goals for specific interventions  -cluster care  Outcome: Progressing

## 2025-03-18 NOTE — DISCHARGE SUMMARY
[facility] HOSPITAL[facility]  DISCHARGE SUMMARY     Matt Cornejo Patient Status:  Observation    1935 MRN [id number]   Location [facility] 5NW-A Attending No att. providers found   Hosp Day # 0 PCP Isaias Boo DO     Date of Admission: 3/11/2025  Date of Discharge:  3/13/2025     Discharge Disposition: Assisted Living    Discharge Diagnosis:  # COVID  - No hypoxia so will hold off on steroids or remdesivir.  -As needed inhalers as needed.     # Hypertension  -Will continue on losartan and atenolol.     # Type 2 diabetes  -Will place on hyperglycemia protocol with correction factor insulin.     # Hypothyroidism  - will continue on methimazole    #Thrombocytopenia  -due to covid infection  Repeat cbc as outpatient    #Hypokalemia       History of Present Illness:  89 year old female with history of atrial fibrillation, hypertension, type 2 diabetes presents the emergency room with generalized weakness this been going on for the last week.  No fevers, shortness of breath or cough or chest pain.  Patient's had little to eat or drink over the last couple days due to appetite.  No fevers, chills, nausea, vomiting, diarrhea or constipation.     Brief Synopsis: Patient improved with supportive care.    Lace+ Score: 43  59-90 High Risk  29-58 Medium Risk  0-28   Low Risk       TCM Follow-Up Recommendation:  LACE 29-58: Moderate Risk of readmission after discharge from the hospital.      Procedures during hospitalization:       Incidental or significant findings and recommendations (brief descriptions):      Lab/Test results pending at Discharge:       Consultants:      Discharge Medication List:     Discharge Medications        CONTINUE taking these medications        Instructions Prescription details   acetaminophen 325 MG Tabs  Commonly known as: Tylenol      Take 2 tablets (650 mg total) by mouth every 6 (six) hours as needed for Pain.   Refills: 0     atenolol 50 MG Tabs  Commonly known as: Tenormin      Take 1  tablet (50 mg total) by mouth in the morning and 1 tablet (50 mg total) before bedtime.   Refills: 0     Eye Multivitamin Caps      Take 1 tablet by mouth in the morning and 1 tablet before bedtime.   Refills: 0     glimepiride 2 MG Tabs  Commonly known as: Amaryl      Take 1 tablet (2 mg total) by mouth daily with breakfast.   Refills: 0     losartan 100 MG Tabs  Commonly known as: Cozaar      Take 1 tablet (100 mg total) by mouth 2 (two) times daily.   Refills: 0     methIMAzole 5 MG Tabs  Commonly known as: Tapazole      Take 1 tablet (5 mg total) by mouth daily.   Refills: 0     psyllium Pack  Commonly known as: Metamucil      Take 1 packet by mouth daily.   Refills: 0     SITagliptin Phosphate 100 MG Tabs  Commonly known as: JANUVIA      Take 1 tablet (100 mg total) by mouth daily.   Refills: 0     sodium chloride 0.65 % Soln  Commonly known as: Saline Mist      2 sprays by Nasal route in the morning, at noon, and at bedtime.   Refills: 0     traMADol 50 MG Tabs  Commonly known as: Ultram      Take 1 tablet (50 mg total) by mouth every 6 (six) hours as needed.   Quantity: 20 tablet  Refills: 0     warfarin 2 MG Tabs  Commonly known as: Coumadin      Take 1 tablet (2 mg total) by mouth daily. Daily except friday   Refills: 0     warfarin 5 MG Tabs  Commonly known as: Coumadin      Take 1 tablet (5 mg total) by mouth daily. Take 1 tablet by mouth daily on friday   Refills: 0            STOP taking these medications      amoxicillin clavulanate 875-125 MG Tabs  Commonly known as: Augmentin                 [facility]     Follow-up appointment:   Isaias Boo DO  [address]  [address]  [place] [address]  [phone]    Follow up in 1 week(s)  Follow up    Appointments for Next 30 Days 3/18/2025 - 4/17/2025      None            Vital signs:       Physical Exam:    General: No acute distress   Lungs: clear to auscultation  Cardiovascular: S1, S2  Abdomen:  Soft      -----------------------------------------------------------------------------------------------  PATIENT DISCHARGE INSTRUCTIONS: See electronic chart    Blanca Venegas MD    Total time spent on discharge plannin minutes     The  [employer] Cures Act makes medical notes like these available to patients in the interest of transparency. Please be advised this is a medical document. Medical documents are intended to carry relevant information, facts as evident, and the clinical opinion of the practitioner. The medical note is intended as peer to peer communication and may appear blunt or direct. It is written in medical language and may contain abbreviations or verbiage that are unfamiliar.

## 2025-08-30 ENCOUNTER — HOSPITAL ENCOUNTER (EMERGENCY)
Facility: HOSPITAL | Age: 89
Discharge: HOME | End: 2025-08-30
Attending: EMERGENCY MEDICINE

## 2025-08-30 VITALS
TEMPERATURE: 98 F | RESPIRATION RATE: 28 BRPM | DIASTOLIC BLOOD PRESSURE: 86 MMHG | HEART RATE: 94 BPM | WEIGHT: 153 LBS | SYSTOLIC BLOOD PRESSURE: 167 MMHG | HEIGHT: 69 IN | OXYGEN SATURATION: 100 % | BODY MASS INDEX: 22.66 KG/M2

## 2025-08-30 DIAGNOSIS — B37.31 VULVOVAGINAL CANDIDIASIS: Primary | ICD-10-CM

## 2025-08-30 PROCEDURE — 99283 EMERGENCY DEPT VISIT LOW MDM: CPT

## 2025-08-30 PROCEDURE — 99284 EMERGENCY DEPT VISIT MOD MDM: CPT

## 2025-08-30 RX ORDER — FLUCONAZOLE 150 MG/1
150 TABLET ORAL
Qty: 2 TABLET | Refills: 0 | Status: SHIPPED | OUTPATIENT
Start: 2025-08-30 | End: 2025-09-03

## 2025-08-30 RX ORDER — FLUCONAZOLE 150 MG/1
150 TABLET ORAL ONCE
Status: COMPLETED | OUTPATIENT
Start: 2025-08-30 | End: 2025-08-30

## (undated) NOTE — IP AVS SNAPSHOT
Patient Demographics     Address  [address] [place] [id number] Phone  [phone] ([phone] [facility]) E-mail Address  [email or web address]      Emergency Contact(s)     Name Relation Home Work [address], [address] of [address] [place] [address] Tabs  Commonly known as: COZAAR  Next dose due: Tonight, Saturday 12/4      Take 0.5 tablets (50 mg total) by mouth 2 (two) times daily.    Nonda MAYRA Owen         metFORMIN HCl  MG Tb24  Commonly known as: GLUCOPHAGE-XR      Take 500 mg by mouth 2 12/03/21 2035 Given      [id number] docusate sodium (COLACE) cap 100 mg 12/04/21 0818 Given      [id number] furosemide (LASIX) tab 20 mg 12/04/21 0818 Given      [id number] methimazole (TAPAZOLE) tab 5 mg 12/04/21 0819 Given      [id number] potassium chloride 12/04/21 3051, Result status: Final result   Ordering provider: Tree Reinoso DO  12/03/21 2300 Resulting lab: [facility])    Specimen Information    Type Source Collected On   Blood — 12/04/21 5067          C Component Value Reference Range Flag Lab   PT 30.4 12.2 - 14.5 seconds [facility])   INR 2.83 0.89 - 1.11 [facility])            Testing Performed By     Lab - Abbreviation Name Director Address Valid Date Range    0494 [facility]) ED hypertension presents emergency room with 2 to 3 days of progressive weakness. Weakness is generalized. Patient feeling tired and fatigued. She denies any fevers, chills, nausea, vomiting. No chest pain shortness of breath or cough.   No dizziness, ligh by mouth daily. , Disp: , Rfl:         Review of Systems:   A comprehensive 14 point review of systems was completed. Pertinent positives and negatives noted in the HPI.     Physical Exam:    BP (!) 130/98   Pulse 104   Temp (!) 100.8 °F (38.2 °C) (Tympan input(s): CRP, LAURA, LDH, DDIMER in the last 168 hours. Imaging: Imaging data reviewed in Epic. ASSESSMENT / PLAN:     1. Community-acquired pneumonia-we will continue on ceftriaxone and doxycycline.  Blood cultures drawn in the emergency room and ar generalized fatigue and weakness. Denies margarita angina, orthpnea, PND, near-syncope or syncope. No palpitations with afib. She moved from [address] recently to be close to her daughter.     ECG in ED shows rate controlled afib  Troponin negativ Oral, BID  •  polyethylene glycol (PEG 3350) (MIRALAX) powder packet 17 g, 17 g, Oral, Daily PRN  •  magnesium hydroxide (MILK OF MAGNESIA) 400 MG/5ML suspension 30 mL, 30 mL, Oral, Daily PRN  •  bisacodyl (DULCOLAX) rectal suppository 10 mg, 10 mg, Rectal 12/02/2021    CO2 23.0 12/02/2021     12/02/2021    CA 8.9 12/02/2021    ALB 2.9 12/02/2021    ALKPHO 125 12/02/2021    BILT 2.0 12/02/2021    TP 7.8 12/02/2021    AST 15 12/02/2021    ALT 21 12/02/2021    INR 2.06 12/02/2021    PTP 23.7 12/02/2021 organism, unspecified laterality, unspecified part of lung  Active Problems:    Hypertension, unspecified type    Chronic anticoagulation    Pneumonia due to infectious organism    Hypoxia    Acute febrile illness    Atrial fibrillation, chronic (HCC) person does the patient currently need. ..    Help from Another: Moving to and from a bed to a chair (including a wheelchair)?: A Little   Help from Another: Need to walk in hospital room?: A Little   Help from Another: Climbing 3-5 steps with a railing?: A discharge home. PT Discharge Recommendations: Home    PLAN  Patient has been evaluated and presents with no skilled Physical Therapy needs at this time. Patient discharged from Physical Therapy services.   Please re-order if a new functional limitation p profile/medical history, specific performance deficits impacting engagement in ADL/IADL, client assessment/performance deficits, and clinical decision making): LOW    OT Discharge Recommendations: Home  OT Device Recommendations: None    Recommendations fo walker)    Occupation/Status: retired pharmacist  Hand Dominance: Right          Prior Level of Function: Pt lives alone in apartment at [facility]. Pt is independent with ADLs and mobility.  In her apartment, she does not typically use device, uses cane when out antibiotics  - management of simptoms   Patient/Family Short Term Goal     Interdisciplinary Progressing    Description: Patient's Short Term Goal: to feel better    Interventions:   - safe ambulation   - antibiotics   - ISB and deep breathing